# Patient Record
Sex: MALE | Race: WHITE | NOT HISPANIC OR LATINO | Employment: PART TIME | URBAN - METROPOLITAN AREA
[De-identification: names, ages, dates, MRNs, and addresses within clinical notes are randomized per-mention and may not be internally consistent; named-entity substitution may affect disease eponyms.]

---

## 2018-04-17 ENCOUNTER — HOSPITAL ENCOUNTER (OUTPATIENT)
Facility: HOSPITAL | Age: 23
Setting detail: OBSERVATION
Discharge: HOME | End: 2018-04-18
Attending: EMERGENCY MEDICINE | Admitting: FAMILY MEDICINE
Payer: COMMERCIAL

## 2018-04-17 DIAGNOSIS — L03.012 CELLULITIS OF FINGER OF LEFT HAND: ICD-10-CM

## 2018-04-17 DIAGNOSIS — J93.9 PNEUMOTHORAX ON LEFT: ICD-10-CM

## 2018-04-17 DIAGNOSIS — R07.9 CHEST PAIN: Primary | ICD-10-CM

## 2018-04-17 PROBLEM — Z72.0 TOBACCO ABUSE: Chronic | Status: ACTIVE | Noted: 2018-04-17

## 2018-04-17 PROCEDURE — 99285 EMERGENCY DEPT VISIT HI MDM: CPT

## 2018-04-17 PROCEDURE — 93005 ELECTROCARDIOGRAM TRACING: CPT

## 2018-04-17 RX ORDER — OXYCODONE HYDROCHLORIDE 5 MG/1
5 TABLET ORAL ONCE
Status: COMPLETED | OUTPATIENT
Start: 2018-04-17 | End: 2018-04-17

## 2018-04-17 RX ORDER — FENTANYL CITRATE 50 UG/ML
50 INJECTION, SOLUTION INTRAMUSCULAR; INTRAVENOUS ONCE
Status: DISCONTINUED | OUTPATIENT
Start: 2018-04-17 | End: 2018-04-17

## 2018-04-17 RX ADMIN — OXYCODONE HYDROCHLORIDE 5 MG: 5 TABLET ORAL at 22:38

## 2018-04-18 ENCOUNTER — APPOINTMENT (OUTPATIENT)
Dept: RADIOLOGY | Facility: HOSPITAL | Age: 23
End: 2018-04-18
Payer: COMMERCIAL

## 2018-04-18 VITALS
HEIGHT: 69 IN | TEMPERATURE: 97.3 F | BODY MASS INDEX: 16.23 KG/M2 | SYSTOLIC BLOOD PRESSURE: 121 MMHG | HEART RATE: 51 BPM | DIASTOLIC BLOOD PRESSURE: 72 MMHG | OXYGEN SATURATION: 100 % | WEIGHT: 109.57 LBS | RESPIRATION RATE: 18 BRPM

## 2018-04-18 LAB
ATRIAL RATE: 88 BPM
QRS AXIS: 83 DEGREES
QRSD INTERVAL: 96 MS
QT INTERVAL: 362 MS
QTC INTERVAL: 404 MS
T WAVE AXIS: 62 DEGREES
VENTRICULAR RATE: 75 BPM

## 2018-04-18 PROCEDURE — 71045 X-RAY EXAM CHEST 1 VIEW: CPT

## 2018-04-18 PROCEDURE — 99244 OFF/OP CNSLTJ NEW/EST MOD 40: CPT | Performed by: INTERNAL MEDICINE

## 2018-04-18 PROCEDURE — 71046 X-RAY EXAM CHEST 2 VIEWS: CPT

## 2018-04-18 PROCEDURE — 99236 HOSP IP/OBS SAME DATE HI 85: CPT | Performed by: PHYSICIAN ASSISTANT

## 2018-04-18 PROCEDURE — 93010 ELECTROCARDIOGRAM REPORT: CPT | Performed by: INTERNAL MEDICINE

## 2018-04-18 RX ORDER — IBUPROFEN 200 MG
400 TABLET ORAL EVERY 8 HOURS PRN
Status: SHIPPED | OUTPATIENT
Start: 2018-04-18

## 2018-04-18 RX ORDER — ACETAMINOPHEN 325 MG/1
650 TABLET ORAL EVERY 6 HOURS PRN
Status: DISCONTINUED | OUTPATIENT
Start: 2018-04-18 | End: 2018-04-18 | Stop reason: HOSPADM

## 2018-04-18 RX ORDER — CEPHALEXIN 500 MG/1
500 CAPSULE ORAL EVERY 6 HOURS SCHEDULED
Qty: 24 CAPSULE | Refills: 0 | Status: SHIPPED | OUTPATIENT
Start: 2018-04-18 | End: 2018-04-24

## 2018-04-18 RX ORDER — CEPHALEXIN 500 MG/1
500 CAPSULE ORAL EVERY 6 HOURS SCHEDULED
Status: DISCONTINUED | OUTPATIENT
Start: 2018-04-18 | End: 2018-04-18 | Stop reason: HOSPADM

## 2018-04-18 RX ORDER — IBUPROFEN 400 MG/1
800 TABLET ORAL EVERY 8 HOURS PRN
Status: DISCONTINUED | OUTPATIENT
Start: 2018-04-18 | End: 2018-04-18 | Stop reason: HOSPADM

## 2018-04-18 RX ADMIN — IBUPROFEN 800 MG: 400 TABLET, FILM COATED ORAL at 00:48

## 2018-04-18 RX ADMIN — CEPHALEXIN 500 MG: 500 CAPSULE ORAL at 12:07

## 2018-04-18 RX ADMIN — CEPHALEXIN 500 MG: 500 CAPSULE ORAL at 00:46

## 2018-04-18 RX ADMIN — CEPHALEXIN 500 MG: 500 CAPSULE ORAL at 06:03

## 2018-04-18 NOTE — CASE MANAGEMENT
[address] [facility] in the [employer] by Jerardo Irving for 2017  Network Utilization Review Department  Phone: [phone]; Fax [phone]  ATTENTION: The Network Utilization Review Department is now centralized for our 7 Facilities  Please call with any questions or concerns to [phone] and carefully follow the prompts so that you are directed to the right person  All voicemails are confidential  Fax any determinations, approvals, denials, and requests for initial or continue stay review clinical to [phone]  Due to HIGH CALL volume, it would be easier if you could please send faxed requests to expedite your requests and in part, help us provide discharge notifications faster   /////////////////////////////////////////////////////////////////////////////////////////////////////////////////    Initial Clinical Review    Admission: Date/Time/Statement: admit OBS on  4/17  @  8179    Orders Placed This Encounter   Procedures    Place in Observation (expected length of stay for this patient is less than two midnights)     Standing Status:   Standing     Number of Occurrences:   1     Order Specific Question:   Admitting Physician     Answer:   Delmer Alberts [[id number]]     Order Specific Question:   Level of Care     Answer:   Med Surg [16]         ED: Date/Time/Mode of Arrival:   ED Arrival Information     Expected Arrival Acuity Means of Arrival Escorted By Service Admission Type    4/17/2018 21:49 4/17/2018 21:56 Urgent Carried Self General Medicine Urgent    Arrival Complaint    chest pain, pneumothorax on cxr          Chief Complaint:   Chief Complaint   Patient presents with    Chest Pain     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   They said I have a partially collapsed lung"       History of Illness:     25 y o  male who was at work yesterday when he experienced sudden onset stabbing, debilitating left-sided anterior chest pain that went to his back and shoulder area  He did not seek treatment at that time  pain has waxed and waned in the last 24 hours  He has decreased breath sounds in the left upper field  He has no wheezes  ED Vital Signs:   ED Triage Vitals   Temperature Pulse Respirations Blood Pressure SpO2   04/17/18 2203 04/17/18 2203 04/17/18 2203 04/17/18 2203 04/17/18 2203   98 6 °F (37 °C) 84 16 146/69 100 %      Temp Source Heart Rate Source Patient Position - Orthostatic VS BP Location FiO2 (%)   04/18/18 0017 04/17/18 2343 04/18/18 0017 04/17/18 2343 --   Tympanic Monitor Sitting Right arm       Pain Score       04/17/18 2203       5        Wt Readings from Last 1 Encounters:   04/18/18 49 7 kg (109 lb 9 1 oz)     On room air w/100% sats     Abnormal Labs/Diagnostic Test Results:   CXR (from urgent care) was reviewed by myself and our radiologist - which shows a 10-15% PTX on the L    ED Treatment:   Medication Administration from 04/17/2018 2149 to 04/17/2018 7322       Date/Time Order Dose Route Action Action by Comments     04/17/2018 2237 oxyCODONE (ROXICODONE) IR tablet 5 mg 5 mg Oral Given Liliana Spear RN           Past Medical/Surgical History: Active Ambulatory Problems     Diagnosis Date Noted    No Active Ambulatory Problems     Resolved Ambulatory Problems     Diagnosis Date Noted    No Resolved Ambulatory Problems     Past Medical History:   Diagnosis Date    Cellulitis of finger of left hand 4/17/2018    Pneumothorax on left 4/17/2018    Tobacco abuse 4/17/2018       Admitting Diagnosis: Chest pain [R07 9]  Pneumothorax on left [J93 9]  Chest pain, unspecified [R07 9]    Assessment:  Per ATTENDING:  Assessment and plan:  · Spontaneous pneumothorax-likely due to spontaneous rupture of bleb  Start oxygen via face mask accelerated resorption  Repeat chest x-ray at 2:00 p m  Agree with pulmonary evaluation  · Tobacco abuse-strongly emphasized need for tobacco cessation  Left index finger cellulitis-continue Keflex for 7 days total    Resident A/P:   Pneumothorax on left   Plan:  · Vital signs stable on room air  · Repeat chest x-ray tomorrow  · Consult pulmonology  · Encouraged smoking cessation and complete nicotine abstinence     Left index finger cellulitis  · Continue p o  Keflex  · Monitor for clinical improvement and consider MRSA     Tobacco/nicotine abuse  · Encourage total cessation    Admission Orders:  [address] pulmonology  Out of bed as tolerated  Reg diet  Incentive spirometry  Reg diet    Scheduled Meds:   Current Facility-Administered Medications:  acetaminophen 650 mg Oral Q6H PRN Ora King PA-C   cephalexin 500 mg Oral Q6H [address] Ora MATTHEW King   ibuprofen 800 mg Oral Q8H PRN Ora King PA-C       4/18/2018  98 3   67   18   139/79    98% RA       4/18  PULMONOLOGY  1  Spontaneous pneumothorax  1  Oxygen therapy with facemask to aid in reabsoprtion  2  Repeat CXR today at 1400  3  Will need outpatient CXR in 1 week  2  Tobacco abuse  1   Cessation encouraged

## 2018-04-18 NOTE — ED PROVIDER NOTES
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  They said I have a partially collapsed lung"     50-year-old male presents to the emergency department for evaluation of pneumothorax  Patient states that yesterday he was driving to work and then had sudden onset of left-sided chest pain  Patient states that chest pain is been constant since yesterday and radiates to his back  Patient states he has not taken any medication for pain  Patient otherwise states he has never had this happen to him before  Patient denies any fever chest shortness of breath nausea vomiting abdominal pain dysuria constipation or diarrhea              None       Past Medical History:   Diagnosis Date    Cellulitis of finger of left hand 4/17/2018    Pneumothorax on left 4/17/2018    Tobacco abuse 4/17/2018       History reviewed  No pertinent surgical history  History reviewed  No pertinent family history  I have reviewed and agree with the history as documented  Social History   Substance Use Topics    Smoking status: Current Some Day Smoker    Smokeless tobacco: Never Used    Alcohol use Yes      Comment: social        Review of Systems   Constitutional: Negative for appetite change, chills, diaphoresis, fatigue and fever  HENT: Negative for congestion, ear discharge, ear pain, hearing loss, postnasal drip, rhinorrhea, sneezing and sore throat  Eyes: Negative for pain, discharge and redness  Respiratory: Negative for cough, choking, chest tightness, shortness of breath, wheezing and stridor  Cardiovascular: Positive for chest pain  Negative for palpitations  Gastrointestinal: Negative for abdominal distention, abdominal pain, blood in stool, constipation, diarrhea, nausea and vomiting  Genitourinary: Negative for decreased urine volume, difficulty urinating, dysuria, flank pain, frequency and hematuria  Musculoskeletal: Negative for arthralgias, gait problem, joint swelling and neck pain  Skin: Negative for color change, pallor and rash  Allergic/Immunologic: Negative for environmental allergies, food allergies and immunocompromised state  Neurological: Negative for dizziness, seizures, weakness, light-headedness, numbness and headaches  Hematological: Negative for adenopathy  Does not bruise/bleed easily  Psychiatric/Behavioral: Negative for agitation and behavioral problems  Physical Exam  ED Triage Vitals   Temperature Pulse Respirations Blood Pressure SpO2   04/17/18 2203 04/17/18 2203 04/17/18 2203 04/17/18 2203 04/17/18 2203   98 6 °F (37 °C) 84 16 146/69 100 %      Temp Source Heart Rate Source Patient Position - Orthostatic VS BP Location FiO2 (%)   04/18/18 0017 04/17/18 2343 04/18/18 0017 04/17/18 2343 --   Tympanic Monitor Sitting Right arm       Pain Score       04/17/18 2203       5           Orthostatic Vital Signs  Vitals:    04/17/18 2203 04/17/18 2343 04/18/18 0017 04/18/18 0800   BP: 146/69 113/79 139/79 121/72   Pulse: 84 69 67 (!) 51   Patient Position - Orthostatic VS:   Sitting Lying       Physical Exam   Constitutional: He is oriented to person, place, and time  He appears well-developed and well-nourished  HENT:   Head: Normocephalic and atraumatic  Nose: Nose normal    Mouth/Throat: Oropharynx is clear and moist    Eyes: Conjunctivae and EOM are normal  Pupils are equal, round, and reactive to light  Neck: Normal range of motion  Neck supple  Cardiovascular: Normal rate, regular rhythm and normal heart sounds  Exam reveals no gallop and no friction rub  No murmur heard  Pulmonary/Chest: Effort normal and breath sounds normal  No respiratory distress  He has no wheezes  He has no rales  Abdominal: Soft  Bowel sounds are normal  He exhibits no distension  There is no tenderness  There is no rebound and no guarding  Musculoskeletal: Normal range of motion   He exhibits tenderness  Left sided anterior chest pain   Neurological: He is alert and oriented to person, place, and time  Skin: Skin is warm and dry  Psychiatric: He has a normal mood and affect  His behavior is normal    Nursing note and vitals reviewed  ED Medications  Medications   oxyCODONE (ROXICODONE) IR tablet 5 mg (5 mg Oral Given 4/17/18 2238)       Diagnostic Studies  Results Reviewed     None                 XR chest pa only   Final Result by Jarret Soto MD (04/18 0530)      15% left apical pneumothorax  Agree with ED preliminary impression  Referring physician clinical notes states he is aware of the pneumothorax  Workstation performed: [id number]         XR chest pa & lateral    (Results Pending)   XR chest pa & lateral    (Results Pending)         Procedures  Procedures      Phone Consults  ED Phone Contact    ED Course  ED Course                                MDM  Number of Diagnoses or Management Options  Chest pain:   Pneumothorax on left:   Diagnosis management comments: 58-year-old male presents emergency department for evaluation pneumothorax  I will review patient's chest x-ray  I will treat patient's pain of oxycodone  CritCare Time    Disposition  Final diagnoses:   Chest pain   Pneumothorax on left     Time reflects when diagnosis was documented in both MDM as applicable and the Disposition within this note     Time User Action Codes Description Comment    4/17/2018 11:13 PM Lynne Ortega Add [R07 9] Chest pain     4/17/2018 11:14 PM Lynne Ortega Add [J93 9] Pneumothorax on left     4/18/2018  4:07 PM Emelia Leiva Add [L03 012] Cellulitis of finger of left hand       ED Disposition     ED Disposition Condition Comment    Admit  Case was discussed with STEVEN and the patient's admission status was agreed to be Admission Status: observation status to the service of Dr Rosa Ochoa           Follow-up Information     Follow up With Specialties Details Why Contact Info Chelly Vines MD Pulmonary Disease, Pulmonology Follow up please call the office at the number above after you have completed your chest x-ray, have them dexter Simmons Soraya can complete your xray at the radiology department at [facility], no appointment is necessary-please complete before 2pm [address]  [phone]          Discharge Medication List as of 4/18/2018  4:10 PM      START taking these medications    Details   cephalexin (KEFLEX) 500 mg capsule Take 1 capsule (500 mg total) by mouth every 6 (six) hours for 6 days, Starting Wed 4/18/2018, Until Tue 4/24/2018, Print      ibuprofen (MOTRIN) 200 mg tablet Take 2 tablets (400 mg total) by mouth every 8 (eight) hours as needed for mild pain or moderate pain, Starting Wed 4/18/2018, Print             Outpatient Discharge Orders  XR chest pa & lateral   Standing Status: Future  Standing Exp  Date: 04/18/22     Discharge Diet     Call provider for:  severe uncontrolled pain     Call provider for:  difficulty breathing, headache or visual disturbances     Call provider for:  persistent dizziness or light-headedness     Call provider for:  extreme fatigue         ED Provider  Attending physically available and evaluated Nicole Granados  HECTOR managed the patient along with the ED Attending      Electronically Signed by         Kate Leiva MD  04/18/18 2146

## 2018-04-18 NOTE — CONSULTS
Consultation - Pulmonary Medicine   Silver Case 25 y o  male MRN: [id number]  Unit/Bed#: E4 -01 Encounter: [id number]      Assessment/Plan:    1  Spontaneous pneumothorax  1  Oxygen therapy with facemask to aid in reabsoprtion  2  Repeat CXR today at 1400  3  Will need outpatient CXR in 1 week  2  Tobacco abuse  1  Cessation encouraged    History of Present Illness   Physician Requesting Consult: Caridad Gardner MD  Reason for Consult / Principal Problem: Pneumothorax  Hx and PE limited by: none  Chief Complaint: sudden left sided chest pain  HPI: Silver Case is a 25 y o   male who presented to [place] [address]  with complaints of sudden left sided chest pain  He denies additional past medical history  His main presenting symptoms was pain and swelling to his left hand  He reports to working on full shift at [employer] after sudden chest pain, went home and was able to sleep, worked an additional shift and went to an Urgent care  During Urgent care visit he also reported left-sided chest pain that began suddenly the day prior while he was driving  CXR at Urgent care identified an apical pneumothorax and he was sent to the hospital   He denies any recent sick contacts, or exposures  He denies any additional accompanying symptoms  He was admitted and placed on 100% NRB to aid in resolution of pneumothorax      He is a current smoker, he reports to smoking either cigarettes or using a vaporizer daily  He does not have a diagnosis of lung disease, have SOB or HUSSEIN or have a nebulizer or inhaler regimen  He denies a history of asthma, or intubations  He denies reflux or coughing with liquids or solids  He currently reports mild chest pain at rest and with inspiration    He denies: cough/sputum production, fevers, chills, night sweats, nausea, vomiting, diarrhea, headache, dizziness, or hemoptysis      Consults    Review of Systems   Constitutional: Negative for activity change, appetite change, chills, fatigue, fever and unexpected weight change  HENT: Negative for postnasal drip, rhinorrhea, sinus pain, sinus pressure and trouble swallowing  Eyes: Negative for discharge  Cardiovascular: Positive for chest pain  Negative for palpitations and leg swelling  Gastrointestinal: Negative for constipation, diarrhea, nausea and vomiting  Endocrine: Negative for cold intolerance  Genitourinary: Negative for difficulty urinating  Musculoskeletal: Negative for arthralgias and myalgias  Skin: Negative for color change, pallor, rash and wound  Neurological: Negative for dizziness, light-headedness and headaches  Psychiatric/Behavioral: Negative for agitation and hallucinations  All other systems reviewed and are negative  Historical Information   Past Medical History:   Diagnosis Date    Cellulitis of finger of left hand 4/17/2018    Pneumothorax on left 4/17/2018    Tobacco abuse 4/17/2018     History reviewed  No pertinent surgical history  Social History   History   Alcohol Use    Yes     Comment: social     History   Drug Use    Types: Marijuana     History   Smoking Status    Current Some Day Smoker   Smokeless Tobacco    Never Used     Occupational History: [employer] employee    Family History: non-contributory    Meds/Allergies   all current active meds have been reviewed, pertinent pulmonary meds have been reviewed, current meds:   Current Facility-Administered Medications   Medication Dose Route Frequency    acetaminophen (TYLENOL) tablet 650 mg  650 mg Oral Q6H PRN    cephalexin (KEFLEX) capsule 500 mg  500 mg Oral Q6H [address]    ibuprofen (MOTRIN) tablet 800 mg  800 mg Oral Q8H PRN    and PTA meds:   None       Allergies   Allergen Reactions    Margaret Meal     Banana     Nickel        Objective   Vitals: Blood pressure 121/72, pulse (!) 51, temperature (!) 97 3 °F (36 3 °C), temperature source Tympanic, resp   rate 18, height 5' 9" (1 753 m), weight 49 7 kg (109 lb 9 1 oz), SpO2 100 %  100%NRB,Body mass index is 16 18 kg/m²  Intake/Output Summary (Last 24 hours) at 04/18/18 1151  Last data filed at 04/18/18 0900   Gross per 24 hour   Intake              960 ml   Output                0 ml   Net              960 ml     Invasive Devices          No matching active lines, drains, or airways          Physical Exam   Constitutional: He is oriented to person, place, and time  He appears well-nourished  No distress  HENT:   Head: Normocephalic and atraumatic  Eyes: Conjunctivae and EOM are normal    Neck: Normal range of motion  Neck supple  No JVD present  Cardiovascular: Normal rate, regular rhythm and normal heart sounds  Exam reveals no gallop and no friction rub  No murmur heard  Pulmonary/Chest: Effort normal  No respiratory distress  He has decreased breath sounds in the left upper field  He has no wheezes  He has no rhonchi  He has no rales  He exhibits no tenderness  Abdominal: Soft  Bowel sounds are normal    Musculoskeletal: Normal range of motion  He exhibits no edema  Lymphadenopathy:     He has no cervical adenopathy  Neurological: He is alert and oriented to person, place, and time  Skin: Skin is warm and dry  No rash noted  No erythema  No pallor  Psychiatric: He has a normal mood and affect  His behavior is normal  Judgment and thought content normal    Vitals reviewed  Lab Results: I have personally reviewed pertinent lab results  ,  none      Imaging Studies: I have personally reviewed pertinent reports  and I have personally reviewed pertinent films in PACS  CXR 4/18/2018: left apical pneumothorax      Pulmonary Results (PFTs, PSG): none    VTE Prophylaxis: Sequential compression device (Venodyne)     Code Status: Level 1 - Full Code      Portions of the record may have been created with voice recognition software    Occasional wrong word or "sound a like" substitutions may have occurred due to the inherent limitations of voice recognition software  Read the chart carefully and recognize, using context, where substitutions have occurred

## 2018-04-18 NOTE — DISCHARGE SUMMARY
Discharge Summary - [facility] Internal Medicine    Patient Information: Barrett Tamayo 25 y o  male MRN: [id number]  Unit/Bed#: E4 -01 Encounter: [id number]    Discharging Physician / Practitioner: Lucy Pennington MD  PCP: No primary care provider on file  Admission Date: 4/17/2018  Discharge Date: 04/18/18    Reason for Admission: chest pain     Discharge Diagnoses:     Principal Problem:    Pneumothorax on left  Active Problems:    Chest pain    Tobacco abuse    Cellulitis of finger of left hand    Consultations During Hospital Stay:  · Pulmonary    Procedures Performed:     · none    Significant Findings:     · none    Incidental Findings:   · none     Test Results Pending at Discharge (will require follow up):   · none     Outpatient Tests Requested:  · none    Complications:  none    Hospital Course:     Barrett Tamayo is a 25 y o  male patient who originally presented to the hospital on 4/17/2018 due to sudden left upper chest pain  He 1st went to an outpatient urgent care center and he was found to have a pneumothorax and was sent to the hospital   A chest x-ray was done here which confirmed a 15% left upper pneumothorax  This was treated with oxygen via face mask  He did not require a chest tube  Follow-up chest x-ray later in the day still showed persistent pneumothorax but without worsening  The patient was hemodynamically stable and had minimal pain and was eager to go home  The patient will be discharged home in stable condition  He will have a chest x-ray tomorrow and this will be followed up by the pulmonary service  The patient was advised to return to the hospital should his chest pain worsen or should he developed worsening shortness of breath  He was strongly advised to stop smoking    He was placed on keflex for left index finger cellulitis  A 6 day prescription was provided        Condition at Discharge: good     Discharge Day Visit / Exam:     * Please refer to separate progress for these details *    Discharge instructions/Information to patient and family:   See after visit summary for information provided to patient and family  Provisions for Follow-Up Care:  See after visit summary for information related to follow-up care and any pertinent home health orders  Disposition:     Home      Discharge Statement:  I spent 30 minutes discharging the patient  This time was spent on the day of discharge  I had direct contact with the patient on the day of discharge  Greater than 50% of the total time was spent examining patient, answering all patient questions, arranging and discussing plan of care with patient as well as directly providing post-discharge instructions  Additional time then spent on discharge activities  Spoke with significant other and father earlier today  Discharge Medications:  See after visit summary for reconciled discharge medications provided to patient and family  ** Please Note: Dragon 360 Dictation voice to text software may have been used in the creation of this document   **

## 2018-04-18 NOTE — ED ATTENDING ATTESTATION
Consuelo Oliva MD, saw and evaluated the patient  I have discussed the patient with the resident/non-physician practitioner and agree with the resident's/non-physician practitioner's findings, Plan of Care, and MDM as documented in the resident's/non-physician practitioner's note, except where noted  All available labs and Radiology studies were reviewed  At this point I agree with the current assessment done in the Emergency Department  I have conducted an independent evaluation of this patient a history and physical is as follows:      Critical Care Time  CritCare Time    Procedures     C/o L sided chest pain - sudden onset yesterday while driving around 6pm   No trauma  Pt  Went to [facility] and had a CXR that showed a pneuomothorax  No sob  Pt  Never had a pneuomothorax before  Well-appearing,no distress, RRR, CTA b/l, abd  -nontender, ext  - no edema, pulse ox is 100 % RA    Pt  Admitted for observation    CXR (from urgent care) was reviewed by myself and our radiologist - which shows a 10-15% PTX on the L   Pt  Is stable for observation admission

## 2018-04-18 NOTE — H&P
History and Physical - [facility]    Patient Information: Estefany Macdonald 25 y o  male MRN: [id number]  Unit/Bed#: E4 -01 Encounter: [id number]  Admitting Physician: Sun Buitrago PA-C  PCP: No primary care provider on file  Date of Admission:  04/18/18      Assessment:    Pneumothorax on left    Plan:    Pneumothorax on left  · Vital signs stable on room air  · Repeat chest x-ray tomorrow  · Consult pulmonology  · Encouraged smoking cessation and complete nicotine abstinence    Left index finger cellulitis  · Continue p o  Keflex  · Monitor for clinical improvement and consider MRSA    Tobacco/nicotine abuse  · Encourage total cessation      HPI:   Krzysztof Damico is a 25 y o  male who was at work yesterday when he experienced sudden onset stabbing, debilitating left-sided anterior chest pain that went to his back and shoulder area  He did not seek treatment at that time  The pain would wax and wane in the last 24 hours  He went to urgent care today because of redness and pain with finger flexion of the left hand index finger  He noticed it when he was washing dishes yesterday  Denies: Chest pain, Shortness of Breath, Nausea, Vomiting, Diarrhea, Dysuria    ROS:  A 12-point review of systems was done  Please see the HPI for the full details  All other systems negative  PMH:  Principal Problem:    Pneumothorax on left  Active Problems:    Chest pain    Tobacco abuse    Cellulitis of finger of left hand      Past Medical History:   Diagnosis Date    Cellulitis of finger of left hand 4/17/2018    Pneumothorax on left 4/17/2018    Tobacco abuse 4/17/2018     History reviewed  No pertinent surgical history    Social History     Social History    Marital status: Single     Spouse name: N/A    Number of children: N/A    Years of education: N/A     Social History Main Topics    Smoking status: Current Some Day Smoker    Smokeless tobacco: Never Used    Alcohol use Yes Comment: social    Drug use: Yes     Types: Marijuana    Sexual activity: Not Asked     Other Topics Concern    None     Social History Narrative    None     History reviewed  No pertinent family history  MED/ALLERGIES:  No current facility-administered medications for this encounter  Allergies   Allergen Reactions    [place] Meal     Banana     Nickel        OBJECTIVE:    Current Vitals:   Blood Pressure: 113/79 (04/17/18 2343)  Pulse: 69 (04/17/18 2343)  Temperature: 98 6 °F (37 °C) (04/17/18 2203)  Respirations: 16 (04/17/18 2343)  Weight - Scale: 52 2 kg (115 lb) (04/17/18 2203)  SpO2: 100 % (04/17/18 2343)    No intake or output data in the 24 hours ending 04/18/18 0010    Invasive Devices          No matching active lines, drains, or airways            Physical Exam   Constitutional: He is oriented to person, place, and time  He appears well-developed and well-nourished  Thin male   HENT:   Head: Normocephalic and atraumatic  Right Ear: External ear normal    Left Ear: External ear normal    Eyes: EOM are normal  Pupils are equal, round, and reactive to light  Neck: Neck supple  No thyromegaly present  Cardiovascular: Normal rate, regular rhythm and normal heart sounds  Exam reveals no gallop  No murmur heard  Pulmonary/Chest: Effort normal  No accessory muscle usage  No respiratory distress  He has decreased breath sounds in the left upper field  He has no wheezes  He has no rhonchi  He has no rales  Abdominal: Soft  Normal appearance and bowel sounds are normal  There is no tenderness  There is no CVA tenderness  Musculoskeletal: Normal range of motion  He exhibits no edema or tenderness  Lymphadenopathy:     He has no cervical adenopathy  Neurological: He is alert and oriented to person, place, and time  No cranial nerve deficit  Skin: Skin is warm and dry  No rash noted  No erythema  No pallor  Psychiatric: He has a normal mood and affect   His behavior is normal  Judgment and thought content normal                                                      Lab Results:          Invalid input(s): LABALBU  No results found for: CKTOTAL, CKMB, CKMBINDEX, TROPONINI      Imaging: CXR: Left apical pneumothorax    EKG: NSR, No ST segment elevation or depression; No T-wave changes    VTE Prophylaxis: Reason for no pharmacologic prophylaxis LOW RISK    Code Status: FULL    Counseling / Coordination of Care: Total floor / unit time spent today 45 minutes  Anticipated Length of Stay will be: MORE THAN 2 (TWO) Midnights:     Leydi Álvarez PA-C    This note has been constructed using a voice recognition system

## 2018-04-19 ENCOUNTER — HOSPITAL ENCOUNTER (OUTPATIENT)
Dept: RADIOLOGY | Facility: HOSPITAL | Age: 23
Discharge: HOME | End: 2018-04-19
Attending: INTERNAL MEDICINE
Payer: COMMERCIAL

## 2018-04-19 ENCOUNTER — TELEPHONE (OUTPATIENT)
Dept: OTHER | Facility: HOSPITAL | Age: 23
End: 2018-04-19

## 2018-04-19 DIAGNOSIS — J93.11 PRIMARY SPONTANEOUS PNEUMOTHORAX: Primary | ICD-10-CM

## 2018-04-19 DIAGNOSIS — J93.9 PNEUMOTHORAX ON LEFT: ICD-10-CM

## 2018-04-19 PROCEDURE — 71046 X-RAY EXAM CHEST 2 VIEWS: CPT

## 2018-04-19 NOTE — TELEPHONE ENCOUNTER
Mushtaq Santacruz completed his CXR today in the radiology department at [facility]  CXR demonstrated mild improvement in left apical pneumothorax  Mushtaq Santacruz was called and results were provided  Plan will be to have a repeat CXR Monday, at which time I will review the imaging and then contact mary with the results  He was instructed to come to the emergency department if he developed: shortness of breath or chest pain    Mary understood instructions/plan and denied further questions

## 2018-04-23 ENCOUNTER — TELEPHONE (OUTPATIENT)
Dept: OTHER | Facility: HOSPITAL | Age: 23
End: 2018-04-23

## 2018-04-23 ENCOUNTER — HOSPITAL ENCOUNTER (OUTPATIENT)
Dept: RADIOLOGY | Facility: HOSPITAL | Age: 23
Discharge: HOME | End: 2018-04-23
Payer: COMMERCIAL

## 2018-04-23 DIAGNOSIS — J93.11 PRIMARY SPONTANEOUS PNEUMOTHORAX: ICD-10-CM

## 2018-04-23 PROCEDURE — 71046 X-RAY EXAM CHEST 2 VIEWS: CPT

## 2018-04-23 NOTE — TELEPHONE ENCOUNTER
I called Martha De Luna today and provided the results of his CXR  His chest x-ray demonstrated significant improvement in the left apical pneumothorax  Ohiojackie De Luna reports chest pain is very minimal and feels it hs significantly improved  He denies Shortness of breath at rest or dyspnea on exertion  He was instructed that further follow up was unnecessary unless he develops increasing SOB or chest pain, at which time he should return to the hospital for evaluation  He was again counseled on the importance of smoking cessation with both cigarettes and vaporizer  He expressed understanding of instructions and denied any further questions

## 2020-06-22 ENCOUNTER — OFFICE VISIT (OUTPATIENT)
Dept: URGENT CARE | Age: 25
End: 2020-06-22
Payer: COMMERCIAL

## 2020-06-22 VITALS — HEART RATE: 67 BPM | RESPIRATION RATE: 16 BRPM | OXYGEN SATURATION: 98 % | TEMPERATURE: 103.6 F

## 2020-06-22 DIAGNOSIS — R50.9 FEVER, UNSPECIFIED FEVER CAUSE: Primary | ICD-10-CM

## 2020-06-22 LAB — S PYO AG THROAT QL: NEGATIVE

## 2020-06-22 PROCEDURE — U0003 INFECTIOUS AGENT DETECTION BY NUCLEIC ACID (DNA OR RNA); SEVERE ACUTE RESPIRATORY SYNDROME CORONAVIRUS 2 (SARS-COV-2) (CORONAVIRUS DISEASE [COVID-19]), AMPLIFIED PROBE TECHNIQUE, MAKING USE OF HIGH THROUGHPUT TECHNOLOGIES AS DESCRIBED BY CMS-2020-01-R: HCPCS | Performed by: PHYSICIAN ASSISTANT

## 2020-06-22 PROCEDURE — 87880 STREP A ASSAY W/OPTIC: CPT | Performed by: PHYSICIAN ASSISTANT

## 2020-06-22 PROCEDURE — 99213 OFFICE O/P EST LOW 20 MIN: CPT | Performed by: PHYSICIAN ASSISTANT

## 2020-06-23 ENCOUNTER — TELEPHONE (OUTPATIENT)
Dept: URGENT CARE | Age: 25
End: 2020-06-23

## 2020-06-23 LAB — SARS-COV-2 RNA SPEC QL NAA+PROBE: NOT DETECTED

## 2022-02-02 ENCOUNTER — TELEPHONE (OUTPATIENT)
Dept: NEUROLOGY | Facility: CLINIC | Age: 27
End: 2022-02-02

## 2022-02-02 NOTE — TELEPHONE ENCOUNTER
Called and left a voicemail for patient - Please call back to confirm upcoming appointment with Dr Dominique Recio  Provided patient with apt date, time and location  Informed patient that check in is at least 15 minutes prior to apt time  SUGGESTED PT CHANGE TO VIRTUAL

## 2022-02-09 NOTE — TELEPHONE ENCOUNTER
Called and spoke to patient - confirmed upcoming appointment with Dr Annetta Haas  Provided patient with apt date, time and location  Informed patient that check in is at least 15 minutes prior to apt time

## 2022-02-15 NOTE — PROGRESS NOTES
[facility] Consult  PATIENT:  Lakisha Deleon  MRN:  [id number]  :  1995  DATE OF SERVICE:  2022  REFERRED BY: Todd Aguilar MD  PMD: No primary care provider on file  Assessment/Plan:     Mckinley Mccann II is a delightful 23 y o  male with a past medical history that includes fibromyalgia, anxiety, depression, migraine, hypersomnia, dyslipidemia, medical marijuana use, posttraumatic headache, fatigue, 20 head trauma with right side laceration with chronic paresthesias referred here for evaluation of headache  My initial evaluation 2022     Chronic migraine without aura without status migrainosus, not intractable  Numbness and tingling of right side of face  He has a long history of chronic headaches and migraines dating back to childhood  Family history of headaches in both parents  He also has history of chronic pain syndrome and other areas of the body  He reports head pain typically may start within 30 minutes of waking and can build to the day be coming often most significant about 4-6 hours into the day  Often starts with right facial paresthesias and radiates back along the right side of the head with allodynia and hyperesthesia  Also has left-sided head pressure  He denies aura and reports typical associated migrainous features without unilateral autonomic features to suggest trigeminal autonomic cephalalgia  - as of 2022 he reports headaches have been every day for at least the past 1-2 years, worse on average 15 days per month or more  Do of course get worse temporarily after hitting his head  The paresthesias seemed to start after scalp laceration incident 2020, however will evaluate to ensure no intracranial cause  Trial of Emgality for prevention       Workup:  - Neurologic assessment reveals normal neurological exam, except for decreased sensation right face V2 V3 distribution and hyperesthesias V1 distribution where previous trauma was  - due to increased frequency and severity of headaches and migraines I recommend further evaluation with MRI brain with without contrast to rule out structural or treatable causes of symptoms  With focus on trigeminal region on right to ensure no other cause for facial paresthesias    Preventative:  - we discussed headache hygiene and lifestyle factors that may improve headaches, including following up with sleep medicine for previously ordered MLST test, mental health care, decreasing caffeine  - trial of emgality  Discussed proper use, possible side effects and risks  - Currently on through other providers: medical marijuana   - Past/ failed/contraindicated: Amitriptyline, Venlafaxine, Gabapentin, Propranolol     - future options: alternative CGRP med, botox    Abortive:  - discussed not taking over-the-counter or prescription pain medications more than 3 days per week to prevent medication overuse/rebound headache - typically exedrin helps   - prescription abortive not indicated at this time as migraines are daily and would need to decrease number of migraines prior to adding abortive that can only be taken a few times a month  - Past/ failed/contraindicated: OTC meds   - future options:  Could consider triptan, prochlorperazine, Toradol IM or p o , could consider trial for 5 days of Depakote or dexamethasone for prolonged migraine, ubrelvy, reyvow, nurtec      Acute post-traumatic headache, not intractable  H/O concussion - resolved  He has followed with other providers in the past for concussion  Most recently 01/22/2022 he accidentally fell down the steps and hit his right forehead and worsening of his daily chronic headaches and dizziness  Could have been a concussion versus posttraumatic headache with migrainous features  We discussed regardless, at this time concussion would likely be over    He subsequently has followed up with primary care provider and [facility] NP team  We have discussed concussions and the natural course of recovery  We have discussed that symptoms from a concussion typically take 2 weeks to resolve, and although sometimes it can feel like concussion symptoms linger on, at this point these symptoms would be related to contributing factors  - Contributing factors may include:   Post traumatic stress, Prolonged removal from normal routine,  posttraumatic headache,  comorbid injuries, preexisting chronic headaches or migraines,  medication overuse headache, anxiety or depression, stress, deconditioning,  comorbid medical diagnoses, young age  - I have recommended gradual return of normal cognitive and physical activity with safety precautions  - We discussed that newer research regarding concussion shows that the sooner one returns gradually to their normal physical and cognitive routine, the sooner one tends to recover  Prolonged removal from normal routine and deconditioning have been shown to prolong symptoms and worsen symptoms   - We discussed that sometimes there is a constellation of symptoms that some refer to as "post concussion syndrome," but I prefer not to use this term since that can be misleading and make people think they are still brain injured or "concussed," when the most common and likely etiology this far out from the head trauma is either contributing factors or a form of functional neurologic disorder with mixed symptoms  - We discussed how cognitive issues can have multiple causes and often related to multifactorial etiologies including stress, anxiety,  mood, pain, hypervigilance  and sleep issues and provided reassurance that, it is not likely the cognitive dysfunction is related to concussion at this point    - Safe driving precautions, should not drive at all if feeling sleepy or cognitively not well  * We discussed the most likely therapy to help in these cases would be counseling    I do not typically recommend cognitive therapy, vision therapy or other specific concussion therapies once concussion is over  Hypersomnia, sleep study 2016-for [facility], MLST ordered and not completed  -     Ambulatory referral to Sleep Medicine; Future    Patient instructions          Additional Testing:   Neurodiagnostic workup:  MRI Brain ordered    Headache/migraine treatment:   Abortive medications (for immediate treatment of a headache): It is ok to take ibuprofen, acetaminophen or naproxen (Advil, Tylenol,  Aleve, Excedrin) if they help your headaches you should limit these to No more than 3 times a week to avoid medication overuse/rebound headaches  Prescription preventive medications for headaches/migraines   (to take every day to help prevent headaches - not to take at the time of headache):  [x] Emgality/Galcanezumab - the 1st dose is 240 mg loading dose of 2 consecutive 120 mg injections  Thereafter, 120 mg injections every 30 days     READ INSTRUCTIONS that come with the medication  REFRIGERATE  Keep out of direct sunlight  Prior to administration, allow to come to room temperature for 30 minutes  Do not warm using a heat source (eg, microwave or hot water)  Do not shake  Administer in preferably abdomen (avoiding 2 inches around the navel), thigh, upper arm, or buttocks avoiding areas of skin that are tender, bruised, red or hard  Deliver entire contents of single-use prefilled pen or syringe  *Typically these types of medications take time untill you see the benefit, although some may see improvement in days, often it may take weeks, especially if the medication is being titrated up to a beneficial level  Please contact us if there are any concerns or questions regarding the medication  Lifestyle Recommendations:  [x] SLEEP - Maintain a regular sleep schedule: Adults need at least 7-8 hours of uninterrupted a night   Maintain good sleep hygiene:  Going to bed and waking up at consistent times, avoiding excessive daytime naps, avoiding caffeinated beverages in the evening, avoid excessive stimulation in the evening and generally using bed primarily for sleeping  One hour before bedtime would recommend turning lights down lower, decreasing your activity (may read quietly, listen to music at a low volume)  When you get into bed, should eliminate all technology (no texting, emailing, playing with your phone, iPad or tablet in bed)  [x] HYDRATION - Maintain good hydration  Drink  2L of fluid a day (4 typical small water bottles)  [x] DIET - Maintain good nutrition  In particular don't skip meals and try and eat healthy balanced meals regularly  [x] TRIGGERS - Look for other triggers and avoid them: Limit caffeine to 1-2 cups a day or less  Avoid dietary triggers that you have noticed bring on your headaches (this could include aged cheese, peanuts, MSG, aspartame and nitrates)  [x] EXERCISE - physical exercise as we all know is good for you in many ways, and not only is good for your heart, but also is beneficial for your mental health, cognitive health and  chronic pain/headaches  I would encourage at the least 5 days of physical exercise weekly for at least 30 minutes  Education and Follow-up  [x] Please call with any questions or concerns  Of course if any new concerning symptoms go to the emergency department  [x] Follow up 3 months, sooner if needed     - As we discussed if there are no abnormalities on the brain MRI that need urgent intervention (very often there are incidental findings that may not mean anything significant and are better discussed in person), you will not necessarily receive a call from us, but feel free to call in to check on the status of the report (since not knowing can be anxiety provoking) and the nurses will let you know the result or make sure I have nothing to pass on regarding the results first   Ideally, all of this will be discussed in detail in the follow-up visit         CC:   We had the pleasure of evaluating Adam Santiago II in neurological consultation today  Adam Santiago II is a   right handed male who presents today for evaluation of headaches  History obtained from patient as well as available medical record review  History of Present Illness:   Current medical illnesses  or past medical history include fibromyalgia, anxiety, depression, migraine, hypersomnia, dyslipidemia, posttraumatic headache, fatigue, 12/26/20 head trauma with right side laceration with chronic paresthesias     On 01/22/2022 he accidentally fell down the steps at his house and landed at the bottom hitting his head on the hardwood railing  Hit right forehead  Acute symptoms included:  No LOC, no amnesia, headaches, dizzy  Stayed home from work 3 days  Followed up with PCP 01/26/2022    he has followed with [facility] NP team in the past, see EMR   - most recent visit 1/27/22    Headaches started at what age? 89 years old  How often do the headaches occur?   - as of 2/16/2022: 1-2 years of every day, bad every other day   What time of the day do the headaches start? Not there when he wakes for 30 mins and builds throughout the day   How long do the headaches last? All day, 4-6 hour after waking worse  Are you ever headache free? Yes    Aura? without aura     Last eye exam: in 2021, no other issues     Where is your headache located and pain quality?   - right side of head, right forehead, right parietal, right face  - V1-V3 numb tingling and then works back along right side and hair feels heavy  - then left side of head pressure      What is the intensity of pain?  Average: 3-6/10, worst 9-10/10  Associated symptoms:   [x] Nausea       [x] Vomiting - can happen for 2 days in a row and then not for 3 months    [x] Problems with concentration  [x] Photophobia >    [x]Phonophobia      [] Osmophobia  [x] Blurred vision   [x] Light-headed or dizzy     [] Tinnitus    [x] right facial paresthesias and no where else   [] Ptosis      [] Facial droop  [] Lacrimation  [] Nasal congestion/rhinorrhea  - chronic congestion       Things that make the headache worse? No specific movements, any quick movement     Headache triggers:  Stress, caffeine withdrawal, anything can trigger it    Have you seen someone else for headaches or pain? Yes   Have you ever had any Brain imaging? CT scan     What medications do you take or have you taken for your headaches? ABORTIVE:      exedrin every other day, brings it down and able to function   Acetaminophen  Ibuprofen    Past  Tylenol 3 with codeine     PREVENTIVE:       Medical marijuana every day     Past/ failed/contraindicated:  Amitriptyline he thinks  Venlafaxine  Gabapentin   Propranolol     -      LIFESTYLE  Sleep - chronic hypersomnia - sleep study 12/2016  - no significant sleep apnea, AHI 3 5, MLST ordered  - averages: 7-10 hours    Water: a gallon per day  Caffeine: 2 energy drinks per day    Mood: anxiety, depression, never with psychiatry or counselor, "ok"  - mom is a therapist     The following portions of the patient's history were reviewed and updated as appropriate: allergies, current medications, past family history, past medical history, past social history, past surgical history and problem list     Pertinent family history:  Family history of headaches:  headaches of unknown type in mother and father  Any family history of aneurysms - Not first gen - dad's step dad    Pertinent social history:  Work: not currently   - as of 2/16/2022 he reports 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  Education: HS  Lives with two best friends and his fiance, cats       Past Medical History:   - 10/7/13   - 3/31/15   - 12/26/2020, he was on a tire swing in a garage when the swing rope broke and he fell hitting his head on a   tool box  He had a large facial/scalp laceration that was bleeding  He was transported to [facility] for evaluation  Acute symptoms included:  Headache, neck pain, nausea  He was evaluated in the ER were laceration was repaired noncontrast head CT was unremarkable for acute pathology  He reported headache, neck pain and nausea  He subsequently followed up with [facility] NP team who  referred to vestibular, cognitive therapy, neuro optometry   - CT head 1/8/2021 normal       Past Medical History:   Diagnosis Date    Cellulitis of finger of left hand 4/17/2018    Head injury     Pneumothorax on left 4/17/2018    Tobacco abuse 4/17/2018       Patient Active Problem List   Diagnosis    Chest pain    Pneumothorax on left    Tobacco abuse    Cellulitis of finger of left hand       Medications:      Current Outpatient Medications   Medication Sig Dispense Refill    acetaminophen (TYLENOL) 325 mg tablet Take 650 mg by mouth every 6 (six) hours as needed      ibuprofen (MOTRIN) 200 mg tablet Take 2 tablets (400 mg total) by mouth every 8 (eight) hours as needed for mild pain or moderate pain      NON FORMULARY Medical Marijuana      Galcanezumab-gnlm 120 MG/ML SOAJ Inject 240 mg under the skin once for 1 dose For just the first month loading dose, followed by 1 injection monthly on separate prescription  2 mL 0    Galcanezumab-gnlm 120 MG/ML SOAJ Inject 120 mg under the skin every 30 (thirty) days Following the first month loading dose of 2 pens  1 mL 11     No current facility-administered medications for this visit  Allergies:       Allergies   Allergen Reactions    [place] Meal - Food Allergy     Banana - Food Allergy     Nickel Other (See Comments)     "Throat closes"       Family History:     Family History   Problem Relation Age of Onset   Abby Boucher Breast cancer Mother     Heart disease Father     Hypertension Father     Hyperlipidemia Father     Cancer Paternal Grandmother        Social History:       Social History     Socioeconomic History    Marital status: Single     Spouse name: Not on file    Number of children: Not on file    Years of education: Not on file    Highest education level: Not on file   Occupational History    Not on file   Tobacco Use    Smoking status: Former Smoker    Smokeless tobacco: Never Used   Vaping Use    Vaping Use: Every day    Substances: Nicotine, Flavoring   Substance and Sexual Activity    Alcohol use: Yes     Comment: social    Drug use: Yes     Types: Marijuana     Comment: medical marijuana    Sexual activity: Not on file   Other Topics Concern    Not on file   Social History Narrative    Not on file     Social Determinants of Health     Financial Resource Strain: Not on file   Food Insecurity: Not on file   Transportation Needs: Not on file   Physical Activity: Not on file   Stress: Not on file   Social Connections: Not on file   Intimate Partner Violence: Not on file   Housing Stability: Not on file         Objective:       Physical Exam:                                                                 Vitals:            Constitutional:    /64 (BP Location: Left arm, Patient Position: Sitting, Cuff Size: Standard)   Pulse 83   Ht 5' 10" (1 778 m)   Wt 54 kg (119 lb)   BMI 17 07 kg/m²   BP Readings from Last 3 Encounters:   02/16/22 121/64   04/18/18 121/72     Pulse Readings from Last 3 Encounters:   02/16/22 83   06/22/20 67   04/18/18 (!) 51         Well developed, well nourished, well groomed  No dysmorphic features  HEENT:  Normocephalic atraumatic  Oropharynx is clear and moist  No oral mucosal lesions  Chest:  Respirations regular and unlabored      Cardiovascular:  Distal extremities warm without palpable edema or tenderness, no observed significant swelling  Musculoskeletal:  (see below under neurologic exam for evaluation of motor function and gait)   Skin:  warm and dry, not diaphoretic  No apparent birthmarks or stigmata of neurocutaneous disease  Psychiatric:  Normal behavior and appropriate affect        Neurological Examination:     Mental status/cognitive function:   Recent and remote memory intact  Attention span and concentration as well as fund of knowledge are appropriate for age  Normal language and spontaneous speech  Cranial Nerves:  II-visual fields full  Fundi poorly visualized due to pupillary constriction  III, IV, VI-Pupils were equal, round, and reactive to light and accomodation  Extraocular movements were full and conjugate without nystagmus  Convergence 5 cm, conjugate gaze, normal smooth pursuits, normal saccades   V-facial sensation symmetric  decreased at V2,3, hyperesthesia V1   VII-facial expression symmetric, intact forehead wrinkle, strong eye closure, symmetric smile    VIII-hearing grossly intact bilaterally   IX, X-palate elevation symmetric, no dysarthria  XI-shoulder shrug strength intact    XII-tongue protrusion midline  Motor Exam: symmetric bulk and tone throughout, no pronator drift  Power/strength 5/5 bilateral upper and lower extremities, no atrophy, fasciculations or abnormal movements noted  Sensory: grossly intact light touch in all extremities  Reflexes: brachioradialis 2+, biceps 2+, knee 2+, ankle 2+ bilaterally  No ankle clonus  Coordination: Finger nose finger intact bilaterally, no apparent dysmetria, ataxia or tremor noted  Gait: steady casual and tandem gait       Pertinent lab results:   - 04/13/2021 CBC unremarkable except for WBC 10 6  CMP unremarkable except for calcium 10 2  TSH normal 0 45  - 09/21/19 ESR 3, CRP <3    Imaging:   No head imaging noted in our system       Review of Systems:   ROS obtained by medical assistant Personally reviewed and updated if indicated  I recommended PCP follow up for non neurologic problems  Review of Systems   Constitutional: Negative  Negative for appetite change and fever  HENT: Negative  Negative for hearing loss, tinnitus, trouble swallowing and voice change  Eyes: Negative  Negative for photophobia and pain  Respiratory: Negative  Negative for shortness of breath  Cardiovascular: Negative  Negative for palpitations  Gastrointestinal: Negative  Negative for nausea and vomiting  Endocrine: Negative  Negative for cold intolerance  Genitourinary: Negative  Negative for dysuria, frequency and urgency  Musculoskeletal:  Negative for myalgias and neck pain  Skin: Negative  Negative for rash  Neurological: Positive for  headachesNegative for tremors, seizures, syncope, facial asymmetry, speech difficulty, weakness, light-headedness and numbness  Hematological: Negative  Does not bruise/bleed easily  Psychiatric/Behavioral: Negative  Negative for confusion, hallucinations and sleep disturbance  I have spent 58 minutes with Patient today in which greater than 50% of this time was spent in counseling/coordination of care regarding Diagnostic results, Prognosis, Risks and benefits of tx options, Intructions for management, Patient education, Importance of tx compliance, Risk factor reductions and Impressions  I also spent 30 minutes non face to face for this patient the same day           Author:  Eula Su MD 2/16/2022 10:17 AM

## 2022-02-16 ENCOUNTER — CONSULT (OUTPATIENT)
Dept: NEUROLOGY | Facility: CLINIC | Age: 27
End: 2022-02-16
Payer: COMMERCIAL

## 2022-02-16 VITALS
SYSTOLIC BLOOD PRESSURE: 121 MMHG | HEART RATE: 83 BPM | HEIGHT: 70 IN | BODY MASS INDEX: 17.04 KG/M2 | WEIGHT: 119 LBS | DIASTOLIC BLOOD PRESSURE: 64 MMHG

## 2022-02-16 DIAGNOSIS — G44.319 ACUTE POST-TRAUMATIC HEADACHE, NOT INTRACTABLE: Primary | ICD-10-CM

## 2022-02-16 DIAGNOSIS — R20.2 NUMBNESS AND TINGLING OF RIGHT SIDE OF FACE: ICD-10-CM

## 2022-02-16 DIAGNOSIS — G43.709 CHRONIC MIGRAINE WITHOUT AURA WITHOUT STATUS MIGRAINOSUS, NOT INTRACTABLE: ICD-10-CM

## 2022-02-16 DIAGNOSIS — R20.0 NUMBNESS AND TINGLING OF RIGHT SIDE OF FACE: ICD-10-CM

## 2022-02-16 DIAGNOSIS — G47.10 HYPERSOMNIA: ICD-10-CM

## 2022-02-16 DIAGNOSIS — Z87.820 H/O CONCUSSION: ICD-10-CM

## 2022-02-16 PROCEDURE — 99205 OFFICE O/P NEW HI 60 MIN: CPT | Performed by: PSYCHIATRY & NEUROLOGY

## 2022-02-16 RX ORDER — ACETAMINOPHEN 325 MG/1
650 TABLET ORAL EVERY 6 HOURS PRN
COMMUNITY

## 2022-02-16 NOTE — PROGRESS NOTES
Review of Systems   Constitutional: Negative  Negative for appetite change and fever  HENT: Negative  Negative for hearing loss, tinnitus, trouble swallowing and voice change  Eyes: Negative  Negative for photophobia and pain  Respiratory: Negative  Negative for shortness of breath  Cardiovascular: Negative  Negative for palpitations  Gastrointestinal: Negative  Negative for nausea and vomiting  Endocrine: Negative  Negative for cold intolerance  Genitourinary: Negative  Negative for dysuria, frequency and urgency  Musculoskeletal: Positive for gait problem  Negative for myalgias and neck pain  Skin: Negative  Negative for rash  Neurological: Positive for dizziness and headaches (daily)  Negative for tremors, seizures, syncope, facial asymmetry, speech difficulty, weakness, light-headedness and numbness  Hematological: Negative  Does not bruise/bleed easily  Psychiatric/Behavioral: Negative  Negative for confusion, hallucinations and sleep disturbance

## 2022-02-16 NOTE — PATIENT INSTRUCTIONS
Additional Testing:   Neurodiagnostic workup:  MRI Brain ordered    Headache/migraine treatment:   Abortive medications (for immediate treatment of a headache): It is ok to take ibuprofen, acetaminophen or naproxen (Advil, Tylenol,  Aleve, Excedrin) if they help your headaches you should limit these to No more than 3 times a week to avoid medication overuse/rebound headaches  Prescription preventive medications for headaches/migraines   (to take every day to help prevent headaches - not to take at the time of headache):  [x] Emgality/Galcanezumab - the 1st dose is 240 mg loading dose of 2 consecutive 120 mg injections  Thereafter, 120 mg injections every 30 days     READ INSTRUCTIONS that come with the medication  REFRIGERATE  Keep out of direct sunlight  Prior to administration, allow to come to room temperature for 30 minutes  Do not warm using a heat source (eg, microwave or hot water)  Do not shake  Administer in preferably abdomen (avoiding 2 inches around the navel), thigh, upper arm, or buttocks avoiding areas of skin that are tender, bruised, red or hard  Deliver entire contents of single-use prefilled pen or syringe  *Typically these types of medications take time untill you see the benefit, although some may see improvement in days, often it may take weeks, especially if the medication is being titrated up to a beneficial level  Please contact us if there are any concerns or questions regarding the medication  Lifestyle Recommendations:  [x] SLEEP - Maintain a regular sleep schedule: Adults need at least 7-8 hours of uninterrupted a night  Maintain good sleep hygiene:  Going to bed and waking up at consistent times, avoiding excessive daytime naps, avoiding caffeinated beverages in the evening, avoid excessive stimulation in the evening and generally using bed primarily for sleeping    One hour before bedtime would recommend turning lights down lower, decreasing your activity (may read quietly, listen to music at a low volume)  When you get into bed, should eliminate all technology (no texting, emailing, playing with your phone, iPad or tablet in bed)  [x] HYDRATION - Maintain good hydration  Drink  2L of fluid a day (4 typical small water bottles)  [x] DIET - Maintain good nutrition  In particular don't skip meals and try and eat healthy balanced meals regularly  [x] TRIGGERS - Look for other triggers and avoid them: Limit caffeine to 1-2 cups a day or less  Avoid dietary triggers that you have noticed bring on your headaches (this could include aged cheese, peanuts, MSG, aspartame and nitrates)  [x] EXERCISE - physical exercise as we all know is good for you in many ways, and not only is good for your heart, but also is beneficial for your mental health, cognitive health and  chronic pain/headaches  I would encourage at the least 5 days of physical exercise weekly for at least 30 minutes  Education and Follow-up  [x] Please call with any questions or concerns  Of course if any new concerning symptoms go to the emergency department  [x] Follow up 3 months, sooner if needed     - As we discussed if there are no abnormalities on the brain MRI that need urgent intervention (very often there are incidental findings that may not mean anything significant and are better discussed in person), you will not necessarily receive a call from us, but feel free to call in to check on the status of the report (since not knowing can be anxiety provoking) and the nurses will let you know the result or make sure I have nothing to pass on regarding the results first   Ideally, all of this will be discussed in detail in the follow-up visit

## 2022-02-23 ENCOUNTER — TELEPHONE (OUTPATIENT)
Dept: OTHER | Facility: OTHER | Age: 27
End: 2022-02-23

## 2022-02-23 NOTE — TELEPHONE ENCOUNTER
Patient called to provide the  approval/confirmation for the MRI scheduled tomorrow 2/24 @ 7:45     Approval: [id number]

## 2022-02-24 ENCOUNTER — HOSPITAL ENCOUNTER (OUTPATIENT)
Dept: MRI IMAGING | Facility: HOSPITAL | Age: 27
Discharge: HOME | End: 2022-02-24
Attending: PSYCHIATRY & NEUROLOGY
Payer: COMMERCIAL

## 2022-02-24 DIAGNOSIS — R20.0 NUMBNESS AND TINGLING OF RIGHT SIDE OF FACE: ICD-10-CM

## 2022-02-24 DIAGNOSIS — R20.2 NUMBNESS AND TINGLING OF RIGHT SIDE OF FACE: ICD-10-CM

## 2022-02-24 DIAGNOSIS — G44.319 ACUTE POST-TRAUMATIC HEADACHE, NOT INTRACTABLE: ICD-10-CM

## 2022-02-24 DIAGNOSIS — G43.709 CHRONIC MIGRAINE WITHOUT AURA WITHOUT STATUS MIGRAINOSUS, NOT INTRACTABLE: ICD-10-CM

## 2022-02-24 PROCEDURE — A9585 GADOBUTROL INJECTION: HCPCS | Performed by: PSYCHIATRY & NEUROLOGY

## 2022-02-24 PROCEDURE — G1004 CDSM NDSC: HCPCS

## 2022-02-24 PROCEDURE — 70553 MRI BRAIN STEM W/O & W/DYE: CPT

## 2022-02-24 RX ADMIN — GADOBUTROL 5 ML: 604.72 INJECTION INTRAVENOUS at 10:16

## 2022-03-24 ENCOUNTER — TELEPHONE (OUTPATIENT)
Dept: NEUROLOGY | Facility: CLINIC | Age: 27
End: 2022-03-24

## 2022-03-24 NOTE — TELEPHONE ENCOUNTER
Patient calling to ask name of provider he saw at last visit  Also requesting MRI results - reviewed Normal study with him  Patient noted he was unable to  emgality  Call placed to pharmacy         ID: [id number]  GROUP: -  LORI: DENISE  BIN: [id number]  [phone]

## 2022-03-25 NOTE — TELEPHONE ENCOUNTER
PA was started on CMM  Rivero: Ivonne Velarde    PA initiated on CMM    Message from plan:  Additional Information Required  Please contact the members plan for prior authorization request    Created new Key ([employer]) Key: [id number] [facility], [facility]    Initiated on [facility]    Awaiting determination

## 2022-03-29 NOTE — TELEPHONE ENCOUNTER
PA has been approved through 9/28/2022  Called [facility] pharmacy, spoke w/Geri and advised of the approval  She was able to get a paid claim for the loading dose  Copay is $75  Pt made aware  Advised to download the saving card online  Can bring this to the pharmacy and it may lower his copay cost  Pt verbalized understanding

## 2022-05-18 ENCOUNTER — TELEPHONE (OUTPATIENT)
Dept: NEUROLOGY | Facility: CLINIC | Age: 27
End: 2022-05-18

## 2022-05-24 ENCOUNTER — TELEPHONE (OUTPATIENT)
Dept: NEUROLOGY | Facility: CLINIC | Age: 27
End: 2022-05-24

## 2022-05-24 ENCOUNTER — OFFICE VISIT (OUTPATIENT)
Dept: NEUROLOGY | Facility: CLINIC | Age: 27
End: 2022-05-24
Payer: COMMERCIAL

## 2022-05-24 VITALS
WEIGHT: 118.3 LBS | BODY MASS INDEX: 16.94 KG/M2 | DIASTOLIC BLOOD PRESSURE: 62 MMHG | SYSTOLIC BLOOD PRESSURE: 108 MMHG | TEMPERATURE: 97.8 F | HEIGHT: 70 IN

## 2022-05-24 DIAGNOSIS — F41.9 ANXIETY AND DEPRESSION: ICD-10-CM

## 2022-05-24 DIAGNOSIS — F32.A ANXIETY AND DEPRESSION: ICD-10-CM

## 2022-05-24 DIAGNOSIS — G43.709 CHRONIC MIGRAINE WITHOUT AURA WITHOUT STATUS MIGRAINOSUS, NOT INTRACTABLE: Primary | ICD-10-CM

## 2022-05-24 PROCEDURE — 99215 OFFICE O/P EST HI 40 MIN: CPT | Performed by: PSYCHIATRY & NEUROLOGY

## 2022-05-24 RX ORDER — RIZATRIPTAN BENZOATE 10 MG/1
10 TABLET ORAL ONCE AS NEEDED
Qty: 9 TABLET | Refills: 6 | Status: SHIPPED | OUTPATIENT
Start: 2022-05-24

## 2022-05-24 NOTE — PROGRESS NOTES
Review of Systems   Constitutional: Negative  Negative for appetite change and fever  HENT: Negative  Negative for hearing loss, tinnitus, trouble swallowing and voice change  Eyes: Negative  Negative for photophobia and pain  Respiratory: Negative  Negative for shortness of breath  Cardiovascular: Negative  Negative for palpitations  Gastrointestinal: Negative  Negative for nausea and vomiting  Endocrine: Negative  Negative for cold intolerance  Genitourinary: Negative  Negative for dysuria, frequency and urgency  Musculoskeletal: Negative  Negative for myalgias and neck pain  Skin: Negative  Negative for rash  Neurological: Positive for dizziness, numbness (right side of the face) and headaches  Negative for tremors, seizures, syncope, facial asymmetry, speech difficulty, weakness and light-headedness  Hematological: Negative  Does not bruise/bleed easily  Psychiatric/Behavioral: Positive for sleep disturbance  Negative for confusion and hallucinations  Memory concerns   States hes unable to remember thing from a few minutes earlier or some ADL's as well as simple things that he usually does daily like feeding his cats

## 2022-05-24 NOTE — PROGRESS NOTES
[facility] Consult - Follow up   PATIENT:  Katerin Urena  MRN:  [id number]  :  1995  DATE OF SERVICE:  2022  REFERRED BY: No ref  provider found  PMD: No primary care provider on file  Assessment/Plan:   Tru Pat II is a delightful 23 y o  male with a past medical history that includes fibromyalgia, anxiety, depression, migraine, hypersomnia, dyslipidemia, medical marijuana use, posttraumatic headache, fatigue, 20 head trauma with right side laceration with chronic paresthesias referred here for evaluation of headache  My initial evaluation 2022     Chronic migraine without aura without status migrainosus, not intractable  Numbness and tingling of right side of face  Anxiety and depression   He has a long history of chronic headaches and migraines dating back to childhood  Family history of headaches in both parents  He also has history of chronic pain syndrome at other areas of the body  He reports head pain typically may start within 30 minutes of waking and can build to the day be coming often most significant about 4-6 hours into the day  Often starts with right facial paresthesias and radiates back along the right side of the head with allodynia and hyperesthesia  Also has left-sided head pressure  He denies aura and reports typical associated migrainous features without unilateral autonomic features to suggest trigeminal autonomic cephalalgia  - as of 2022 he reports headaches have been every day for at least the past 1-2 years, worse on average 15 days per month or more  Do of course get worse temporarily after hitting his head  The paresthesias seemed to start after scalp laceration incident 2020, however will evaluate to ensure no intracranial cause  Trial of Emgality for prevention    - as of 2022: normal MRI brain reviewed, no structural explanation for his headaches or paresthesias of face   He has thus far had two months of emgality, does not think he has had significant improvement yet, but would give it more time  Needs to follow-up with sleep medicine and mental healthcare team and referral placed again  Not interested in additional prescription preventative  Trial of rizatriptan for abortive  Workup:  - Neurologic assessment reveals normal neurological exam, except for decreased sensation right face V2 V3 distribution and hyperesthesias V1 distribution where previous trauma was   - MRI brain cavernous/trigeminal with without contrast 02/24/2022: Normal MRI of the brain  Dedicated imaging of the brainstem and trigeminal nerves is unremarkable  Preventative:  - we discussed headache hygiene and lifestyle factors that may improve headaches, including following up with sleep medicine for previously ordered MLST test, mental health care, decreasing caffeine  - continue emgality for 3-6 month trial  Discussed proper use, possible side effects and risks  - Currently on through other providers: medical marijuana   - Past/ failed/contraindicated: Amitriptyline, Venlafaxine, Gabapentin, Propranolol     - future options: alternative CGRP med, botox    Abortive:  - discussed not taking over-the-counter or prescription pain medications more than 3 days per week to prevent medication overuse/rebound headache - typically exedrin helps   - trial rizatriptan 10 mg as migraine abortive  Discussed proper use, possible side effects and risks  - Past/ failed/contraindicated: OTC meds   - future options:  Could consider triptan, prochlorperazine, Toradol IM or p o , could consider trial for 5 days of Depakote or dexamethasone for prolonged migraine, ubrelvy, reyvow, nurtec      H/O concussion - resolved  He has followed with other providers in the past for concussion  Most recently 01/22/2022 he accidentally fell down the steps and hit his right forehead and worsening of his daily chronic headaches and dizziness  Could have been a concussion versus posttraumatic headache with migrainous features  We discussed regardless, at this time concussion would likely be over  He subsequently has followed up with primary care provider and [facility] NP team       We have discussed concussions and the natural course of recovery  We have discussed that symptoms from a concussion typically take 2 weeks to resolve, and although sometimes it can feel like concussion symptoms linger on, at this point these symptoms would be related to contributing factors  - Contributing factors may include:   Post traumatic stress, Prolonged removal from normal routine,  posttraumatic headache,  comorbid injuries, preexisting chronic headaches or migraines,  medication overuse headache, anxiety or depression, stress, deconditioning,  comorbid medical diagnoses, young age  - I have recommended gradual return of normal cognitive and physical activity with safety precautions  - We discussed that newer research regarding concussion shows that the sooner one returns gradually to their normal physical and cognitive routine, the sooner one tends to recover   Prolonged removal from normal routine and deconditioning have been shown to prolong symptoms and worsen symptoms   - We discussed that sometimes there is a constellation of symptoms that some refer to as "post concussion syndrome," but I prefer not to use this term since that can be misleading and make people think they are still brain injured or "concussed," when the most common and likely etiology this far out from the head trauma is either contributing factors or a form of functional neurologic disorder with mixed symptoms  - We discussed how cognitive issues can have multiple causes and often related to multifactorial etiologies including stress, anxiety,  mood, pain, hypervigilance  and sleep issues and provided reassurance that, it is not likely the cognitive dysfunction is related to concussion at this point    - Safe driving precautions, should not drive at all if feeling sleepy or cognitively not well  * We discussed the most likely therapy to help in these cases would be counseling  I do not typically recommend cognitive therapy, vision therapy or other specific concussion therapies once concussion is over  Hypersomnia, sleep study 2016-for [facility], MLST ordered and not completed  -     Ambulatory referral to Sleep Medicine placed 2/16/2022    Anxiety and depression  -     Ambulatory referral to [facility]; Future  -     Ambulatory referral to Psychiatry; Future    Patient instructions      Referral to psychiatry and counseling for mood which is likely contributing to symptoms   Will have our  reach out to with a list of counselors covered by your insurance to see if this helps  Please follow up with Referral to sleep medicine placed 2/16/2022    Consider the book "the Body keeps the Score" interesting book on post traumatic stress    [employer] - Download this free [employer] on your phone (they will offer subscription, but you do not have to do this)  - I recommend listening to the first approximately 5 or so lectures (more if you want) that are about 10-20 minutes long on the neuroscience of pain  - They discuss how pain works in the brain and steps to try and cure chronic pain    I recommend these free lectures from [employer]  "The basic neuroscience of pain"  "Pain is more than just tissue damage"  "How pain becomes chronic"  "What does the pain mean to you"  "What to do next"      Headache/migraine treatment:   Abortive medications (for immediate treatment of a headache): It is ok to take ibuprofen, acetaminophen or naproxen (Advil, Tylenol,  Aleve, Excedrin) if they help your headaches you should limit these to No more than 3 times a week to avoid medication overuse/rebound headaches       For your more moderate to severe migraines take this medication early   Maxalt (rizatriptan) 10mg tabs - take one at the onset of headache  May repeat one time after 1-2 hours if pain has not resolved  (Max 2 a day and 9 a month)     Prescription preventive medications for headaches/migraines   (to take every day to help prevent headaches - not to take at the time of headache):  [x] Emgality/Galcanezumab -  120 mg injections every 30 days     READ INSTRUCTIONS that come with the medication  REFRIGERATE  Keep out of direct sunlight  Prior to administration, allow to come to room temperature for 30 minutes  Do not warm using a heat source (eg, microwave or hot water)  Do not shake  Administer in preferably abdomen (avoiding 2 inches around the navel), thigh, upper arm, or buttocks avoiding areas of skin that are tender, bruised, red or hard  Deliver entire contents of single-use prefilled pen or syringe  *Typically these types of medications take time untill you see the benefit, although some may see improvement in days, often it may take weeks, especially if the medication is being titrated up to a beneficial level  Please contact us if there are any concerns or questions regarding the medication  Lifestyle Recommendations:  [x] SLEEP - Maintain a regular sleep schedule: Adults need at least 7-8 hours of uninterrupted a night  Maintain good sleep hygiene:  Going to bed and waking up at consistent times, avoiding excessive daytime naps, avoiding caffeinated beverages in the evening, avoid excessive stimulation in the evening and generally using bed primarily for sleeping  One hour before bedtime would recommend turning lights down lower, decreasing your activity (may read quietly, listen to music at a low volume)  When you get into bed, should eliminate all technology (no texting, emailing, playing with your phone, iPad or tablet in bed)  [x] HYDRATION - Maintain good hydration  Drink  2L of fluid a day (4 typical small water bottles)  [x] DIET - Maintain good nutrition   In particular don't skip meals and try and eat healthy balanced meals regularly  [x] TRIGGERS - Look for other triggers and avoid them: Limit caffeine to 1-2 cups a day or less  Avoid dietary triggers that you have noticed bring on your headaches (this could include aged cheese, peanuts, MSG, aspartame and nitrates)  [x] EXERCISE - physical exercise as we all know is good for you in many ways, and not only is good for your heart, but also is beneficial for your mental health, cognitive health and  chronic pain/headaches  I would encourage at the least 5 days of physical exercise weekly for at least 30 minutes  Education and Follow-up  [x] Please call with any questions or concerns  Of course if any new concerning symptoms go to the emergency department  [x] Follow up 3 months, sooner if needed         CC: We had the pleasure of evaluating Vaibhav Davis II in neurological consultation today  Vaibhav Daivs II is a   right handed male who presents today for evaluation of headaches  History obtained from patient as well as available medical record review  History of Present Illness:   Interval history as of 5/24/2022  - he has not followed up with sleep medicine as referred    - 2/19/22 ED for N/V and abdominal pain and told cannabinoid hyperemesis syndrome and he feels this is not the issue, this is better now  - MRI brain cavernous/trigeminal with without contrast 02/24/2022: Normal MRI of the brain  Dedicated imaging of the brainstem and trigeminal nerves is unremarkable      Headaches and migraines   Continues have daily headaches and migraines, worse 2-3 days a week     Preventative:   - trial of emgality - called 6 weeks later and had not gotten emgality yet, got it that day and had 2 shots, 4/27/22 and due soon   Denies bothersome side effects     Abortive: exedrin rarely     - mood swings, anger outbursts at times  - short term memory issues occasionally, sometimes will forgets to feed the cats or do the litter, no driving safety issues   PHQ-2/9 Depression Screening    Little interest or pleasure in doing things: 2 - more than half the days  Feeling down, depressed, or hopeless: 1 - several days  Trouble falling or staying asleep, or sleeping too much: 3 - nearly every day  Feeling tired or having little energy: 2 - more than half the days  Poor appetite or overeating: 3 - nearly every day  Feeling bad about yourself - or that you are a failure or have let yourself or your family down: 3 - nearly every day  Trouble concentrating on things, such as reading the newspaper or watching television: 3 - nearly every day  Moving or speaking so slowly that other people could have noticed  Or the opposite - being so fidgety or restless that you have been moving around a lot more than usual: 1 - several days  Thoughts that you would be better off dead, or of hurting yourself in some way: 0 - not at all  PHQ-2 Score: 3  PHQ-2 Interpretation: POSITIVE depression screen  PHQ-9 Score: 18   PHQ-9 Interpretation: Moderately severe depression            History as of initial visit 02/16/2022: On 01/22/2022 he accidentally fell down the steps at his house and landed at the bottom hitting his head on the hardwood railing  Hit right forehead  Acute symptoms included:  No LOC, no amnesia, headaches, dizzy  Stayed home from work 3 days  Followed up with PCP 01/26/2022    he has followed with [facility] NP team in the past, see EMR   - most recent visit 1/27/22    Headaches started at what age? 89 years old  How often do the headaches occur?   - as of 2/16/2022: 1-2 years of every day, bad every other day   What time of the day do the headaches start? Not there when he wakes for 30 mins and builds throughout the day   How long do the headaches last? All day, 4-6 hour after waking worse  Are you ever headache free?  Yes    Aura? without aura     Last eye exam: in 2021, no other issues     Where is your headache located and pain quality?   - right side of head, right forehead, right parietal, right face  - V1-V3 numb tingling and then works back along right side and hair feels heavy  - then left side of head pressure      What is the intensity of pain? Average: 3-6/10, worst 9-10/10  Associated symptoms:   [x] Nausea       [x] Vomiting - can happen for 2 days in a row and then not for 3 months    [x] Problems with concentration  [x] Photophobia >    [x]Phonophobia      [] Osmophobia  [x] Blurred vision   [x] Light-headed or dizzy     [] Tinnitus    [x] right facial paresthesias and no where else   [] Ptosis      [] Facial droop  [] Lacrimation  [] Nasal congestion/rhinorrhea  - chronic congestion       Things that make the headache worse? No specific movements, any quick movement     Headache triggers:  Stress, caffeine withdrawal, anything can trigger it    Have you seen someone else for headaches or pain? Yes   Have you ever had any Brain imaging? CT scan     What medications do you take or have you taken for your headaches?    ABORTIVE:      exedrin every other day, brings it down and able to function   Acetaminophen  Ibuprofen    Past  Tylenol 3 with codeine     PREVENTIVE:       Medical marijuana every day     Past/ failed/contraindicated:  Amitriptyline he thinks  Venlafaxine  Gabapentin   Propranolol     -      LIFESTYLE  Sleep - chronic hypersomnia - sleep study 12/2016  - no significant sleep apnea, AHI 3 5, MLST ordered  - averages: 7-10 hours    Water: a gallon per day  Caffeine: 2 energy drinks per day    Mood: anxiety, depression, never with psychiatry, "ok"  Counselor a long time ago     The following portions of the patient's history were reviewed and updated as appropriate: allergies, current medications, past family history, past medical history, past social history, past surgical history and problem list     Pertinent family history:  Family history of headaches:  headaches of unknown type in mother and father  Any family history of aneurysms - Not first gen - dad's step dad    Pertinent social history:  Work: not currently   - as of 2/16/2022 he reports he was previously was driving a type of forklift-pallet jack  No longer working, stopped 3 weeks ago, they did not fire him he states but "let him go"? 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[employer] [facility] is currently employed  Education: HS  Lives with two best friends and his fiance, cats       Past Medical History:   - 10/7/13   - 3/31/15   - 12/26/2020, he was on a tire swing in a garage when the swing rope broke and he fell hitting his head on a   tool box  He had a large facial/scalp laceration that was bleeding  He was transported to [facility] for evaluation  Acute symptoms included:  Headache, neck pain, nausea  He was evaluated in the ER were laceration was repaired noncontrast head CT was unremarkable for acute pathology  He reported headache, neck pain and nausea    He subsequently followed up with [facility] NP team who  referred to vestibular, cognitive therapy, neuro optometry   - CT head 1/8/2021 normal     Past Medical History:     Past Medical History:   Diagnosis Date    Cellulitis of finger of left hand 4/17/2018    Head injury     Pneumothorax on left 4/17/2018    Tobacco abuse 4/17/2018       Patient Active Problem List   Diagnosis    Chest pain    Pneumothorax on left    Tobacco abuse    Cellulitis of finger of left hand       Medications:      Current Outpatient Medications   Medication Sig Dispense Refill    acetaminophen (TYLENOL) 325 mg tablet Take 650 mg by mouth every 6 (six) hours as needed      Galcanezumab-gnlm 120 MG/ML SOAJ Inject 120 mg under the skin every 30 (thirty) days Following the first month loading dose of 2 pens  1 mL 11    ibuprofen (MOTRIN) 200 mg tablet Take 2 tablets (400 mg total) by mouth every 8 (eight) hours as needed for mild pain or moderate pain      NON FORMULARY Medical Marijuana      rizatriptan (MAXALT) 10 MG tablet Take 1 tablet (10 mg total) by mouth once as needed for migraine May repeat in 2 hours if needed  Max 2/24 hours, 9/month  9 tablet 6     No current facility-administered medications for this visit  Allergies:       Allergies   Allergen Reactions    [place] Meal - Food Allergy     Banana - Food Allergy     Nickel Other (See Comments)     "Throat closes"       Family History:     Family History   Problem Relation Age of Onset   Mami Crystal Breast cancer Mother     Heart disease Father     Hypertension Father     Hyperlipidemia Father     Cancer Paternal Grandmother        Social History:     Social History     Socioeconomic History    Marital status: Single     Spouse name: Not on file    Number of children: Not on file    Years of education: Not on file    Highest education level: Not on file   Occupational History    Not on file   Tobacco Use    Smoking status: Former Smoker    Smokeless tobacco: Never Used   Vaping Use    Vaping Use: Every day    Substances: Nicotine, Flavoring   Substance and Sexual Activity    Alcohol use: Yes     Comment: social    Drug use: Yes     Types: Marijuana     Comment: medical marijuana    Sexual activity: Not on file   Other Topics Concern    Not on file   Social History Narrative    Not on file     Social Determinants of Health     Financial Resource Strain: Not on file   Food Insecurity: Not on file   Transportation Needs: Not on file   Physical Activity: Not on file   Stress: Not on file   Social Connections: Not on file   Intimate Partner Violence: Not on file   Housing Stability: Not on file         Objective:       Physical Exam: Vitals:            Constitutional:    /62 (BP Location: Left arm, Patient Position: Sitting, Cuff Size: Standard)   Temp 97 8 °F (36 6 °C) (Tympanic)   Ht 5' 10" (1 778 m)   Wt 53 7 kg (118 lb 4 8 oz)   BMI 16 97 kg/m²   BP Readings from Last 3 Encounters:   05/24/22 108/62   02/16/22 121/64   04/18/18 121/72     Pulse Readings from Last 3 Encounters:   02/16/22 83   06/22/20 67   04/18/18 (!) 46         Well developed, well nourished, well groomed  No dysmorphic features  HEENT:  Normocephalic atraumatic  See neuro exam   Chest:  Respirations appear regular and unlabored  Cardiovascular:  no observed significant swelling  Musculoskeletal:  (see below under neurologic exam for evaluation of motor function and gait)   Skin:  warm and dry, not diaphoretic  Psychiatric:  Depressed mood and affect        Neurological Examination:     Mental status/cognitive function:   Recent and remote memory intact  Attention span and concentration as well as fund of knowledge are appropriate for age  Normal language and spontaneous speech  Cranial Nerves:  VIII-hearing grossly intact bilaterally   Motor Exam: symmetric bulk throughout  no atrophy, fasciculations or abnormal movements noted  Coordination:  no apparent dysmetria, ataxia or tremor noted  Gait: steady casual gait      Pertinent lab results:   See EMR for recent labs  - 04/13/2021 CBC unremarkable except for WBC 10 6  CMP unremarkable except for calcium 10 2  TSH normal 0 45  - 09/21/19 ESR 3, CRP <3    Imaging: I have personally reviewed imaging and radiology read   - MRI brain cavernous/trigeminal with without contrast 02/24/2022: Normal MRI of the brain  Dedicated imaging of the brainstem and trigeminal nerves is unremarkable  Review of Systems:   ROS obtained by medical assistant Personally reviewed and updated if indicated  I recommended PCP follow up for non neurologic problems  Review of Systems   Constitutional: Negative  Negative for appetite change and fever  HENT: Negative  Negative for hearing loss, tinnitus, trouble swallowing and voice change  Eyes: Negative  Negative for photophobia and pain  Respiratory: Negative  Negative for shortness of breath  Cardiovascular: Negative  Negative for palpitations  Gastrointestinal: Negative  Negative for nausea and vomiting  Endocrine: Negative  Negative for cold intolerance  Genitourinary: Negative  Negative for dysuria, frequency and urgency  Musculoskeletal: Negative  Negative for myalgias and neck pain  Skin: Negative  Negative for rash  Neurological: Positive for  numbness (right side of the face) and headaches  Negative for tremors, seizures, syncope, facial asymmetry, speech difficulty, weakness and light-headedness  Hematological: Negative  Does not bruise/bleed easily  Psychiatric/Behavioral: Positive for sleep disturbance  Negative for confusion and hallucinations  I have spent 28 minutes with Patient  today in which greater than 50% of this time was spent in counseling/coordination of care  I also spent 16 minutes non face to face for this patient the same day         Author:  Sharad Arroyo MD 5/24/2022 1:01 PM

## 2022-05-24 NOTE — PATIENT INSTRUCTIONS
Referral to psychiatry and counseling for mood which is likely contributing to symptoms     Please follow up with Referral to sleep medicine placed 2/16/2022    Consider the book "the Body keeps the Score" interesting book on post traumatic stress    Curable - Download this free Santos on your phone (they will offer subscription, but you do not have to do this)  - I recommend listening to the first approximately 5 or so lectures (more if you want) that are about 10-20 minutes long on the neuroscience of pain  - They discuss how pain works in the brain and steps to try and cure chronic pain    I recommend these free lectures from [employer]  "The basic neuroscience of pain"  "Pain is more than just tissue damage"  "How pain becomes chronic"  "What does the pain mean to you"  "What to do next"      Headache/migraine treatment:   Abortive medications (for immediate treatment of a headache): It is ok to take ibuprofen, acetaminophen or naproxen (Advil, Tylenol,  Aleve, Excedrin) if they help your headaches you should limit these to No more than 3 times a week to avoid medication overuse/rebound headaches  For your more moderate to severe migraines take this medication early   Maxalt (rizatriptan) 10mg tabs - take one at the onset of headache  May repeat one time after 1-2 hours if pain has not resolved  (Max 2 a day and 9 a month)     Prescription preventive medications for headaches/migraines   (to take every day to help prevent headaches - not to take at the time of headache):  [x] Emgality/Galcanezumab -  120 mg injections every 30 days     READ INSTRUCTIONS that come with the medication  REFRIGERATE  Keep out of direct sunlight  Prior to administration, allow to come to room temperature for 30 minutes  Do not warm using a heat source (eg, microwave or hot water)  Do not shake   Administer in preferably abdomen (avoiding 2 inches around the navel), thigh, upper arm, or buttocks avoiding areas of skin that are tender, bruised, red or hard  Deliver entire contents of single-use prefilled pen or syringe  *Typically these types of medications take time untill you see the benefit, although some may see improvement in days, often it may take weeks, especially if the medication is being titrated up to a beneficial level  Please contact us if there are any concerns or questions regarding the medication  Lifestyle Recommendations:  [x] SLEEP - Maintain a regular sleep schedule: Adults need at least 7-8 hours of uninterrupted a night  Maintain good sleep hygiene:  Going to bed and waking up at consistent times, avoiding excessive daytime naps, avoiding caffeinated beverages in the evening, avoid excessive stimulation in the evening and generally using bed primarily for sleeping  One hour before bedtime would recommend turning lights down lower, decreasing your activity (may read quietly, listen to music at a low volume)  When you get into bed, should eliminate all technology (no texting, emailing, playing with your phone, iPad or tablet in bed)  [x] HYDRATION - Maintain good hydration  Drink  2L of fluid a day (4 typical small water bottles)  [x] DIET - Maintain good nutrition  In particular don't skip meals and try and eat healthy balanced meals regularly  [x] TRIGGERS - Look for other triggers and avoid them: Limit caffeine to 1-2 cups a day or less  Avoid dietary triggers that you have noticed bring on your headaches (this could include aged cheese, peanuts, MSG, aspartame and nitrates)  [x] EXERCISE - physical exercise as we all know is good for you in many ways, and not only is good for your heart, but also is beneficial for your mental health, cognitive health and  chronic pain/headaches  I would encourage at the least 5 days of physical exercise weekly for at least 30 minutes  Education and Follow-up  [x] Please call with any questions or concerns   Of course if any new concerning symptoms go to the emergency department    [x] Follow up 3 months, sooner if needed

## 2022-05-24 NOTE — TELEPHONE ENCOUNTER
MSW phoned patient who informed that he is interested in receiving list of in network psychologist via email he confirmed email to be     [email or web address]    Patient is aware that MSW will be sending the list via email tomorrow

## 2022-05-24 NOTE — TELEPHONE ENCOUNTER
----- Message from Puneet Rodas MD sent at 5/24/2022 12:27 PM EDT -----  Could you please help this patient with a list of therapists or psychologists covered by their insurance?

## 2022-05-25 NOTE — TELEPHONE ENCOUNTER
email with list of in network counselors was sent to patient via mail  MSW remains available for any questions or future social needs

## 2022-05-25 NOTE — TELEPHONE ENCOUNTER
Easton Burns,    Please see below list of counselors in the area that are in network with your insurance  When selecting the provider of your choice, please confirm that they continue to be in network with your insurance  Please contact me if you have any questions or if I can apply any additional assistance  [facility]  [address], [place], [address]  Phone: [address] [facility]  [address] [place], [place], [address]  Phone: [phone]    [facility] for [address] [facility] and Families  [address] [facility], [place], [address]  Phone: [address]  [facility], [place], [address]  Phone: [phone]    [facility]  [address] [facility], [place], [address]  Phone: [address]  [facility], [place], [address]  Phone: [address]  [facility], [place], [address]  Phone: [phone]    Ananya Márquez   [address] [facility], [place], [address]  Phone: [phone]    [facility], [place], [address] [facility]  Phone: [address] [facility], [place], [address]  Phone: [phone]    [facility] and [address]  [facility]  [address], [place], [address]  Phone: Nate Cabral  [address] [facility] [employer], [address]  Phone: [phone],      EMILY Ordonez     [address] [facility]  [address] [place] [address]  [phone] phone/ [phone] fax  www [facility]  org

## 2022-07-01 ENCOUNTER — TELEPHONE (OUTPATIENT)
Dept: PSYCHIATRY | Facility: CLINIC | Age: 27
End: 2022-07-01
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
## 2022-07-08 ENCOUNTER — SOCIAL WORK (OUTPATIENT)
Dept: BEHAVIORAL/MENTAL HEALTH CLINIC | Facility: CLINIC | Age: 27
End: 2022-07-08
Payer: COMMERCIAL

## 2022-07-08 DIAGNOSIS — F41.1 GAD (GENERALIZED ANXIETY DISORDER): Primary | ICD-10-CM

## 2022-07-08 DIAGNOSIS — F33.2 SEVERE EPISODE OF RECURRENT MAJOR DEPRESSIVE DISORDER, WITHOUT PSYCHOTIC FEATURES (HCC): ICD-10-CM

## 2022-07-08 DIAGNOSIS — F41.9 ANXIETY AND DEPRESSION: ICD-10-CM

## 2022-07-08 DIAGNOSIS — F32.A ANXIETY AND DEPRESSION: ICD-10-CM

## 2022-07-08 PROBLEM — F33.9 EPISODE OF RECURRENT MAJOR DEPRESSIVE DISORDER (HCC): Status: ACTIVE | Noted: 2022-07-08

## 2022-07-08 PROCEDURE — 90791 PSYCH DIAGNOSTIC EVALUATION: CPT | Performed by: COUNSELOR
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
## 2022-07-11 NOTE — TELEPHONE ENCOUNTER
[address] [facility]  Phone: [address] [facility]  [address] [place], [address]  Phone: [phone]    [facility] [address] and Families  [address], [address]  Phone: [address]  [facility], [address]  Phone: [phone]    [facility]  [address] [facility], [address]  Phone: [address]  [facility], [address]  Phone: [address]  [address], [address]  Phone: [phone]    [facility]  [address] [facility], [address]  Phone: [phone]    [facility] - [address]  Phone: [phone]    Liban Walker  [address], [address]  Phone: [phone]    [facility] and [address]  [address] [facility], [address]  Phone: Nate Cabral  [address] [facility], [address]  Phone: [phone] Oral Minoxidil Pregnancy And Lactation Text: This medication should only be used when clearly needed if you are pregnant, attempting to become pregnant or breast feeding.

## 2022-07-22 ENCOUNTER — SOCIAL WORK (OUTPATIENT)
Dept: BEHAVIORAL/MENTAL HEALTH CLINIC | Facility: CLINIC | Age: 27
End: 2022-07-22
Payer: COMMERCIAL

## 2022-07-22 DIAGNOSIS — F41.1 GAD (GENERALIZED ANXIETY DISORDER): Primary | ICD-10-CM

## 2022-07-22 DIAGNOSIS — F33.2 SEVERE EPISODE OF RECURRENT MAJOR DEPRESSIVE DISORDER, WITHOUT PSYCHOTIC FEATURES (HCC): ICD-10-CM

## 2022-07-22 PROCEDURE — 90834 PSYTX W PT 45 MINUTES: CPT | Performed by: COUNSELOR

## 2022-07-22 NOTE — BH TREATMENT PLAN
Meagan Martinez II  1995       Date of Initial Treatment Plan: 7/22/2022   Date of Current Treatment Plan: 07/22/22    Treatment Plan Number 1    Strengths/Personal Resources for Self Care: fly, mountain bike, working on cars, his cats    Diagnosis:   1  CHANDU (generalized anxiety disorder)     2  Severe episode of recurrent major depressive disorder, without psychotic features ([facility] )         Area of Needs: coping skills to manage stress      Long Term Goal 1: Soham Hou will minimize his stress in his life 3/7 days a week  Target Date: 1/18/2023  Completion Date: TBD         Short Term Objectives for Goal 1:    A: Rousseaueladia Hou will discuss his intrusive thoughts and change them to positive ones   B: Rousseaueladia Hou will discuss his triggers to his stress   C: Soham Hou will problem solve with some of the stress in his life   D: Soham Hou will learn coping skills to manage his stress      GOAL 1: Modality: Individual 2x per month   Completion Date TBD and The person(s) responsible for carrying out the plan is  Soham Hou and [address]: Diagnosis and Treatment Plan explained to Dahlia Mcpherson relates understanding diagnosis and is agreeable to Treatment Plan  Client Comments : Please share your thoughts, feelings, need and/or experiences regarding your treatment plan: He doesn't want to be stressed out anymore  He will snap on others because he's stressed out

## 2022-07-22 NOTE — PSYCH
Psychotherapy Provided: Individual Psychotherapy 45 minutes     Length of time in session: 45 minutes, follow up in 2 week    Encounter Diagnosis     ICD-10-CM    1  CHANDU (generalized anxiety disorder)  F41 1    2  Severe episode of recurrent major depressive disorder, without psychotic features ([facility] )  F33 2        Goals addressed in session: Goal 1     Pain:      none    0    Current suicide risk : Low     D: Alejandro Melendez stated that he was in the hospital this past week for kidney failure  He was throwing up a lot and blood  He reported that he doesn't feel the hospital was supportive and that they blamed his marijuana usage  He expressed that it was a difficult week for him and he only worked once this past week  His parents try to be supportive, but that his dad isn't always supportive  He is feeling stressed as he needs money and can't work  He is going to apply for disability  We discussed some of his concussions and processed through some events that happened in his life  This provider created the treatment plan with Grant PADGETT: Alejandro Melendez seemed frustrated when discussing his health issues as he hasn't felt supported by the doctors  This has been going on for a while and he doesn't feel he's been treated fairly  He was mood congruent, oriented x3, denied SI, SIB, HI    P: This provider will continue working on the identified goals  Behavioral Health Treatment Plan [facility]: Diagnosis and Treatment Plan explained to Candy Cortes relates understanding diagnosis and is agreeable to Treatment Plan   Yes

## 2022-08-19 ENCOUNTER — SOCIAL WORK (OUTPATIENT)
Dept: BEHAVIORAL/MENTAL HEALTH CLINIC | Facility: CLINIC | Age: 27
End: 2022-08-19
Payer: COMMERCIAL

## 2022-08-19 DIAGNOSIS — F33.2 SEVERE EPISODE OF RECURRENT MAJOR DEPRESSIVE DISORDER, WITHOUT PSYCHOTIC FEATURES (HCC): ICD-10-CM

## 2022-08-19 DIAGNOSIS — F41.1 GAD (GENERALIZED ANXIETY DISORDER): Primary | ICD-10-CM

## 2022-08-19 PROCEDURE — 90834 PSYTX W PT 45 MINUTES: CPT | Performed by: COUNSELOR

## 2022-08-19 NOTE — PSYCH
Psychotherapy Provided: Individual Psychotherapy 45 minutes     Length of time in session: 45 minutes, follow up in 2 week    Encounter Diagnosis     ICD-10-CM    1  CHANDU (generalized anxiety disorder)  F41 1    2  Severe episode of recurrent major depressive disorder, without psychotic features ([facility] [address] )  F33 2        Goals addressed in session: Goal 1     Pain:      none    0    Current suicide risk : Low     D: Grant expressed that things have been stressful and he hasn't been able to work as much  We explored his marijuana and caffeine use discussing how this could be affecting him  He shared how he wakes up with headaches everyday and has difficulty doing anything  His anxiety has gotten worst, but he is unable to identify his thoughts due to his concussion  He expressed feeling anxious and recognizing the symptoms, but not understanding why  We discussed how lack of food over the course of the years could be affecting his body as he admitted to eating very little and throwing up daily  He expressed that he has tried to cut back on marijuana for 6 months, but things have gotten worst  He shared that he will see another doctor next week to test how food digests in his body  A: Katey Benson appeared to be annoyed when discussing his issues wanting a fix to the problems  It seems that Katey Benson is in denial that his throwing up is caused by his marijuana usage as he admitted to using daily and several times a day  Katey Benson appeared very malnourished and expressed not eating until 5pm at night as he will often throw up  He seems to have a lot of physical issues that are affecting him mentally  He was oriented x3, denied SI, SIB, HI    P: This provider will continue working on the identified goals  Behavioral Health Treatment Plan [facility]: Diagnosis and Treatment Plan explained to Radha Anderson relates understanding diagnosis and is agreeable to Treatment Plan   Yes

## 2022-09-02 ENCOUNTER — SOCIAL WORK (OUTPATIENT)
Dept: BEHAVIORAL/MENTAL HEALTH CLINIC | Facility: CLINIC | Age: 27
End: 2022-09-02

## 2022-09-02 DIAGNOSIS — F41.1 GAD (GENERALIZED ANXIETY DISORDER): Primary | ICD-10-CM

## 2022-09-02 DIAGNOSIS — F33.2 SEVERE EPISODE OF RECURRENT MAJOR DEPRESSIVE DISORDER, WITHOUT PSYCHOTIC FEATURES (HCC): ICD-10-CM

## 2022-09-02 NOTE — PSYCH
No Call  No Show  No Charge    Deborah Stone II no showed 09/02/22 appointment , staff called and left message to reschedule appointment     Treatment Plan not due at this session

## 2022-09-06 ENCOUNTER — TELEPHONE (OUTPATIENT)
Dept: BEHAVIORAL/MENTAL HEALTH CLINIC | Facility: CLINIC | Age: 27
End: 2022-09-06

## 2022-09-06 NOTE — TELEPHONE ENCOUNTER
NO-SHOW LETTER MAILED TO [employer]    ADDRESS: [address] [place] [phone]

## 2022-09-20 ENCOUNTER — DOCUMENTATION (OUTPATIENT)
Dept: BEHAVIORAL/MENTAL HEALTH CLINIC | Facility: CLINIC | Age: 27
End: 2022-09-20

## 2022-09-20 ENCOUNTER — TELEPHONE (OUTPATIENT)
Dept: BEHAVIORAL/MENTAL HEALTH CLINIC | Facility: CLINIC | Age: 27
End: 2022-09-20

## 2022-09-20 DIAGNOSIS — F33.2 SEVERE EPISODE OF RECURRENT MAJOR DEPRESSIVE DISORDER, WITHOUT PSYCHOTIC FEATURES (HCC): Primary | ICD-10-CM

## 2022-09-20 DIAGNOSIS — F41.1 GAD (GENERALIZED ANXIETY DISORDER): ICD-10-CM

## 2022-09-20 NOTE — PROGRESS NOTES
Assessment/Plan:      Diagnoses and all orders for this visit:    Severe episode of recurrent major depressive disorder, without psychotic features ([facility], [address] )    CHANDU (generalized anxiety disorder)          Subjective:     Patient ID: Luciana Johnson II is a 33 y o  male  Outpatient Discharge Summary:   Admission Date: 7/8/2022  Simba Blanco was referred by Concussion Doctor  Discharge Date: 9/20/2022    Discharge Diagnosis:    1  Severe episode of recurrent major depressive disorder, without psychotic features ([facility], [address] )     2  CHANDU (generalized anxiety disorder)         Treating Physician: n/a  Treatment Complications: n/a  Presenting Problem: Simba Blanco believes he's been dealing with anxiety and depression most of his life, but recently feels it's become a problem with his intrusive thoughts  He was seeing a doctor for his concussion and he's had 17 concussion in his lifetime  He had 2 major concussions spread out over a decade  The most recent was a major one  He moved out from his parents house in 2015 moved into a girlfriends house  They since ended the relationship as he lost his house and animals because of her  His girlfriend at the time cheated on him, was stripping, and she left him leaving the house as he couldn't afford this on his own  He got into another relationship which wasn't mentally stable and he wasn't stable therefore it didn't work out  He's been struggling with intrusive thoughts such as he could slip and die due to hitting his head wrong  He struggles to find other outlets to cope with his intrusive thoughts  He will smoke marijuana recreational at times  He attempted suicide 10 years ago and 5 years ago  He had thoughts that he didn't want to be here  He denied current SI, SA, SIB  He struggles to eat at times and often starves himself  He doesn't get hungry and doesn't want to eat  He struggles with mood swings and will often feel good for a few months and then depressed for another few months   He's been to the ER many times for stress/anxiety, he would throw up a lot, feel numbness in his hands and arms  Recently, he has been connecting the dots and recognizes that he may be struggling more with his mental health that he led himself to believe in the past     Course of treatment includes:    individual therapy   Treatment Progress: fair  Criteria for Discharge: Selma Jang expressed that he was going to "taking other avenues" and asked to be discharged  Aftercare recommendations include continue outpatient therapy  Discharge Medications include:  Current Outpatient Medications:     acetaminophen (TYLENOL) 325 mg tablet, Take 650 mg by mouth every 6 (six) hours as needed, Disp: , Rfl:     Galcanezumab-gnlm 120 MG/ML SOAJ, Inject 120 mg under the skin every 30 (thirty) days Following the first month loading dose of 2 pens  , Disp: 1 mL, Rfl: 11    ibuprofen (MOTRIN) 200 mg tablet, Take 2 tablets (400 mg total) by mouth every 8 (eight) hours as needed for mild pain or moderate pain, Disp: , Rfl:     NON FORMULARY, Medical Marijuana, Disp: , Rfl:     rizatriptan (MAXALT) 10 MG tablet, Take 1 tablet (10 mg total) by mouth once as needed for migraine May repeat in 2 hours if needed   Max 2/24 hours, 9/month , Disp: 9 tablet, Rfl: 6    Prognosis: fair

## 2022-09-20 NOTE — TELEPHONE ENCOUNTER
DISCHARGE LETTER for Simone Martins (certified and regular) placed in outgoing mail on 09/20/22      Article #:  [phone]    Address:  [address] [place] [id number]

## 2022-11-01 ENCOUNTER — TELEPHONE (OUTPATIENT)
Dept: NEUROLOGY | Facility: CLINIC | Age: 27
End: 2022-11-01

## 2022-11-01 NOTE — TELEPHONE ENCOUNTER
Called patient and left voicemail to confirm their upcoming appointment with Dr Fareed Aguilar  Informed patient about arriving in the [place] location 15 minutes prior to appointment to get checked in and go over chart

## 2022-11-08 RX ORDER — PANTOPRAZOLE SODIUM 40 MG/1
40 TABLET, DELAYED RELEASE ORAL 2 TIMES DAILY
COMMUNITY
Start: 2022-08-11

## 2022-11-08 RX ORDER — ONDANSETRON 4 MG/1
4 TABLET, ORALLY DISINTEGRATING ORAL DAILY PRN
COMMUNITY
Start: 2022-08-11

## 2024-01-28 ENCOUNTER — HOSPITAL ENCOUNTER (EMERGENCY)
Facility: HOSPITAL | Age: 29
Discharge: HOME | End: 2024-01-28
Attending: EMERGENCY MEDICINE
Payer: COMMERCIAL

## 2024-01-28 VITALS
RESPIRATION RATE: 16 BRPM | SYSTOLIC BLOOD PRESSURE: 156 MMHG | OXYGEN SATURATION: 98 % | TEMPERATURE: 98.1 F | DIASTOLIC BLOOD PRESSURE: 90 MMHG | HEART RATE: 79 BPM

## 2024-01-28 DIAGNOSIS — R11.2 NAUSEA AND VOMITING: Primary | ICD-10-CM

## 2024-01-28 DIAGNOSIS — D72.829 LEUKOCYTOSIS: ICD-10-CM

## 2024-01-28 LAB
ALBUMIN SERPL BCP-MCNC: 5.1 G/DL (ref 3.5–5)
ALP SERPL-CCNC: 64 U/L (ref 34–104)
ALT SERPL W P-5'-P-CCNC: 50 U/L (ref 7–52)
ANION GAP SERPL CALCULATED.3IONS-SCNC: 28 MMOL/L
AST SERPL W P-5'-P-CCNC: 36 U/L (ref 13–39)
BASOPHILS # BLD MANUAL: 0 THOUSAND/UL (ref 0–0.1)
BASOPHILS NFR MAR MANUAL: 0 % (ref 0–1)
BILIRUB SERPL-MCNC: 0.62 MG/DL (ref 0.2–1)
BUN SERPL-MCNC: 16 MG/DL (ref 5–25)
CALCIUM SERPL-MCNC: 9.9 MG/DL (ref 8.4–10.2)
CHLORIDE SERPL-SCNC: 99 MMOL/L (ref 96–108)
CO2 SERPL-SCNC: 14 MMOL/L (ref 21–32)
CREAT SERPL-MCNC: 1 MG/DL (ref 0.6–1.3)
EOSINOPHIL # BLD MANUAL: 0.34 THOUSAND/UL (ref 0–0.4)
EOSINOPHIL NFR BLD MANUAL: 1 % (ref 0–6)
ERYTHROCYTE [DISTWIDTH] IN BLOOD BY AUTOMATED COUNT: 13.3 % (ref 11.6–15.1)
GFR SERPL CREATININE-BSD FRML MDRD: 102 ML/MIN/1.73SQ M
GLUCOSE SERPL-MCNC: 63 MG/DL (ref 65–140)
HCT VFR BLD AUTO: 44.2 % (ref 36.5–49.3)
HGB BLD-MCNC: 14.6 G/DL (ref 12–17)
LIPASE SERPL-CCNC: 24 U/L (ref 11–82)
LYMPHOCYTES # BLD AUTO: 16 % (ref 14–44)
LYMPHOCYTES # BLD AUTO: 5.41 THOUSAND/UL (ref 0.6–4.47)
MCH RBC QN AUTO: 30.5 PG (ref 26.8–34.3)
MCHC RBC AUTO-ENTMCNC: 33 G/DL (ref 31.4–37.4)
MCV RBC AUTO: 93 FL (ref 82–98)
MONOCYTES # BLD AUTO: 1.69 THOUSAND/UL (ref 0–1.22)
MONOCYTES NFR BLD: 5 % (ref 4–12)
NEUTROPHILS # BLD MANUAL: 26.37 THOUSAND/UL (ref 1.85–7.62)
NEUTS BAND NFR BLD MANUAL: 5 % (ref 0–8)
NEUTS SEG NFR BLD AUTO: 73 % (ref 43–75)
PLATELET # BLD AUTO: 295 THOUSANDS/UL (ref 149–390)
PLATELET BLD QL SMEAR: ADEQUATE
PMV BLD AUTO: 11.8 FL (ref 8.9–12.7)
POTASSIUM SERPL-SCNC: 3.7 MMOL/L (ref 3.5–5.3)
PROT SERPL-MCNC: 8.4 G/DL (ref 6.4–8.4)
RBC # BLD AUTO: 4.78 MILLION/UL (ref 3.88–5.62)
RBC MORPH BLD: NORMAL
SODIUM SERPL-SCNC: 141 MMOL/L (ref 135–147)
WBC # BLD AUTO: 33.81 THOUSAND/UL (ref 4.31–10.16)

## 2024-01-28 PROCEDURE — 85027 COMPLETE CBC AUTOMATED: CPT | Performed by: EMERGENCY MEDICINE

## 2024-01-28 PROCEDURE — 80053 COMPREHEN METABOLIC PANEL: CPT | Performed by: EMERGENCY MEDICINE

## 2024-01-28 PROCEDURE — 36415 COLL VENOUS BLD VENIPUNCTURE: CPT | Performed by: EMERGENCY MEDICINE

## 2024-01-28 PROCEDURE — 85007 BL SMEAR W/DIFF WBC COUNT: CPT | Performed by: EMERGENCY MEDICINE

## 2024-01-28 PROCEDURE — 96374 THER/PROPH/DIAG INJ IV PUSH: CPT

## 2024-01-28 PROCEDURE — 96361 HYDRATE IV INFUSION ADD-ON: CPT

## 2024-01-28 PROCEDURE — 83690 ASSAY OF LIPASE: CPT | Performed by: EMERGENCY MEDICINE

## 2024-01-28 PROCEDURE — 99283 EMERGENCY DEPT VISIT LOW MDM: CPT

## 2024-01-28 PROCEDURE — 96372 THER/PROPH/DIAG INJ SC/IM: CPT

## 2024-01-28 PROCEDURE — 99284 EMERGENCY DEPT VISIT MOD MDM: CPT | Performed by: EMERGENCY MEDICINE

## 2024-01-28 PROCEDURE — C9113 INJ PANTOPRAZOLE SODIUM, VIA: HCPCS | Performed by: EMERGENCY MEDICINE

## 2024-01-28 RX ORDER — ONDANSETRON 4 MG/1
4 TABLET, ORALLY DISINTEGRATING ORAL EVERY 8 HOURS PRN
Qty: 20 TABLET | Refills: 0 | Status: SHIPPED | OUTPATIENT
Start: 2024-01-28

## 2024-01-28 RX ORDER — HALOPERIDOL 5 MG/ML
5 INJECTION INTRAMUSCULAR ONCE
Status: COMPLETED | OUTPATIENT
Start: 2024-01-28 | End: 2024-01-28

## 2024-01-28 RX ORDER — PANTOPRAZOLE SODIUM 40 MG/1
40 TABLET, DELAYED RELEASE ORAL DAILY
Qty: 30 TABLET | Refills: 0 | Status: SHIPPED | OUTPATIENT
Start: 2024-01-28

## 2024-01-28 RX ORDER — PANTOPRAZOLE SODIUM 40 MG/10ML
40 INJECTION, POWDER, LYOPHILIZED, FOR SOLUTION INTRAVENOUS ONCE
Status: COMPLETED | OUTPATIENT
Start: 2024-01-28 | End: 2024-01-28

## 2024-01-28 RX ORDER — DROPERIDOL 2.5 MG/ML
1 INJECTION, SOLUTION INTRAMUSCULAR; INTRAVENOUS ONCE
Status: COMPLETED | OUTPATIENT
Start: 2024-01-28 | End: 2024-01-28

## 2024-01-28 RX ADMIN — SODIUM CHLORIDE 1000 ML: 0.9 INJECTION, SOLUTION INTRAVENOUS at 10:43

## 2024-01-28 RX ADMIN — HALOPERIDOL LACTATE 5 MG: 5 INJECTION, SOLUTION INTRAMUSCULAR at 10:43

## 2024-01-28 RX ADMIN — PANTOPRAZOLE SODIUM 40 MG: 40 INJECTION, POWDER, FOR SOLUTION INTRAVENOUS at 10:46

## 2024-01-28 NOTE — ED PROVIDER NOTES
History  Chief Complaint   Patient presents with    Alcohol Intoxication     Pt arrives from home via EMS with report of ETOH use last PM (+) 8 shots plus, last drink at approx 2200. Pt now with abd pain and nausea/vomiting.     Patient is a 28-year-old male.  He has a history of cannabis hyperemesis syndrome.  He continues to use marijuana daily.  Last night he drank a lot of alcohol.  This morning he woke with abdominal pain, nausea and vomiting.  No diarrhea.  No fever or chills.  Similar to prior episodes.  No hematemesis.  Symptoms are severe without relieving factors.  Presents by EMS for evaluation.        Prior to Admission Medications   Prescriptions Last Dose Informant Patient Reported? Taking?   Galcanezumab-gnlm 120 MG/ML SOAJ   No No   Sig: Inject 120 mg under the skin every 30 (thirty) days Following the first month loading dose of 2 pens.   NON FORMULARY  Self Yes No   Sig: Medical Marijuana   acetaminophen (TYLENOL) 325 mg tablet  Self Yes No   Sig: Take 650 mg by mouth every 6 (six) hours as needed   ibuprofen (MOTRIN) 200 mg tablet  Self No No   Sig: Take 2 tablets (400 mg total) by mouth every 8 (eight) hours as needed for mild pain or moderate pain   ondansetron (ZOFRAN-ODT) 4 mg disintegrating tablet   Yes No   Sig: Take 4 mg by mouth daily as needed   pantoprazole (PROTONIX) 40 mg tablet   Yes No   Sig: Take 40 mg by mouth 2 (two) times a day   rizatriptan (MAXALT) 10 MG tablet   No No   Sig: Take 1 tablet (10 mg total) by mouth once as needed for migraine May repeat in 2 hours if needed. Max 2/24 hours, 9/month.      Facility-Administered Medications: None       Past Medical History:   Diagnosis Date    Cellulitis of finger of left hand 4/17/2018    Head injury     Pneumothorax on left 4/17/2018    Tobacco abuse 4/17/2018       History reviewed. No pertinent surgical history.    Family History   Problem Relation Age of Onset    Breast cancer Mother     Heart disease Father     Hypertension  "Father     Hyperlipidemia Father     Cancer Paternal Grandmother      I have reviewed and agree with the history as documented.    E-Cigarette/Vaping    E-Cigarette Use Current Every Day User      E-Cigarette/Vaping Substances    Nicotine Yes     THC No     CBD No     Flavoring Yes     Other No     Unknown No      Social History     Tobacco Use    Smoking status: Former    Smokeless tobacco: Never   Vaping Use    Vaping status: Every Day    Substances: Nicotine, Flavoring   Substance Use Topics    Alcohol use: Yes     Comment: social    Drug use: Yes     Types: Marijuana     Comment: medical marijuana, pt reports getting marijuana from the street as it is \"cheaper\"       Review of Systems   Constitutional:  Negative for chills and fever.   HENT:  Negative for rhinorrhea and sore throat.    Eyes:  Negative for pain, redness and visual disturbance.   Respiratory:  Negative for cough and shortness of breath.    Cardiovascular:  Negative for chest pain and leg swelling.   Gastrointestinal:  Positive for abdominal pain, nausea and vomiting. Negative for diarrhea.   Endocrine: Negative for polydipsia and polyuria.   Genitourinary:  Negative for dysuria, frequency and hematuria.   Musculoskeletal:  Negative for back pain and neck pain.   Skin:  Negative for rash and wound.   Allergic/Immunologic: Negative for immunocompromised state.   Neurological:  Negative for weakness, numbness and headaches.   Psychiatric/Behavioral:  Negative for hallucinations and suicidal ideas.    All other systems reviewed and are negative.      Physical Exam  Physical Exam  Vitals reviewed.   Constitutional:       General: He is in acute distress.      Appearance: He is not toxic-appearing.   HENT:      Head: Normocephalic and atraumatic.      Nose: Nose normal.      Mouth/Throat:      Mouth: Mucous membranes are moist.   Eyes:      General:         Right eye: No discharge.         Left eye: No discharge.      Conjunctiva/sclera: Conjunctivae " normal.   Cardiovascular:      Rate and Rhythm: Normal rate and regular rhythm.      Pulses: Normal pulses.      Heart sounds: Normal heart sounds. No murmur heard.     No friction rub. No gallop.   Pulmonary:      Effort: Pulmonary effort is normal. No respiratory distress.      Breath sounds: Normal breath sounds. No stridor. No wheezing, rhonchi or rales.   Abdominal:      General: Bowel sounds are normal. There is no distension.      Palpations: Abdomen is soft.      Tenderness: There is abdominal tenderness. There is no right CVA tenderness, left CVA tenderness, guarding or rebound.      Comments: Mild to moderate epigastric tenderness.  No right lower quadrant tenderness.   Musculoskeletal:         General: No swelling, tenderness, deformity or signs of injury. Normal range of motion.      Cervical back: Normal range of motion and neck supple. No rigidity.      Right lower leg: No edema.      Left lower leg: No edema.      Comments: No calf pain or unilateral leg swelling   Skin:     General: Skin is warm and dry.      Coloration: Skin is not jaundiced or pale.      Findings: No bruising, erythema or rash.   Neurological:      General: No focal deficit present.      Mental Status: He is alert and oriented to person, place, and time.      Cranial Nerves: No facial asymmetry.      Sensory: No sensory deficit.      Motor: Motor function is intact.   Psychiatric:         Mood and Affect: Mood normal.         Behavior: Behavior normal.         Vital Signs  ED Triage Vitals [01/28/24 0940]   Temperature Pulse Respirations Blood Pressure SpO2   98.1 °F (36.7 °C) 65 14 161/93 100 %      Temp Source Heart Rate Source Patient Position - Orthostatic VS BP Location FiO2 (%)   Oral -- Lying Right arm --      Pain Score       8           Vitals:    01/28/24 0940 01/28/24 1049 01/28/24 1230   BP: 161/93 145/75 153/80   Pulse: 65 78 76   Patient Position - Orthostatic VS: Lying Lying Lying         Visual Acuity      ED  Medications  Medications   droperidol (FOR EMS ONLY) (INAPSINE) 2.5 mg/mL injection 2.5 mg (0 mg Does not apply Given to EMS 1/28/24 0942)   sodium chloride 0.9 % bolus 1,000 mL (1,000 mL Intravenous New Bag 1/28/24 1043)   haloperidol lactate (HALDOL) injection 5 mg (5 mg Intramuscular Given 1/28/24 1043)   pantoprazole (PROTONIX) injection 40 mg (40 mg Intravenous Given 1/28/24 1046)       Diagnostic Studies  Results Reviewed       Procedure Component Value Units Date/Time    Manual Differential(PHLEBS Do Not Order) [[id number]]  (Abnormal) Collected: 01/28/24 0940    Lab Status: Final result Specimen: Blood from Arm, Left Updated: 01/28/24 1240     Segmented % 73 %      Bands % 5 %      Lymphocytes % 16 %      Monocytes % 5 %      Eosinophils, % 1 %      Basophils % 0 %      Absolute Neutrophils 26.37 Thousand/uL      Lymphocytes Absolute 5.41 Thousand/uL      Monocytes Absolute 1.69 Thousand/uL      Eosinophils Absolute 0.34 Thousand/uL      Basophils Absolute 0.00 Thousand/uL      Total Counted --     RBC Morphology Normal     Platelet Estimate Adequate    Comprehensive metabolic panel [[id number]]  (Abnormal) Collected: 01/28/24 0940    Lab Status: Final result Specimen: Blood from Arm, Left Updated: 01/28/24 1127     Sodium 141 mmol/L      Potassium 3.7 mmol/L      Chloride 99 mmol/L      CO2 14 mmol/L      ANION GAP 28 mmol/L      BUN 16 mg/dL      Creatinine 1.00 mg/dL      Glucose 63 mg/dL      Calcium 9.9 mg/dL      AST 36 U/L      ALT 50 U/L      Alkaline Phosphatase 64 U/L      Total Protein 8.4 g/dL      Albumin 5.1 g/dL      Total Bilirubin 0.62 mg/dL      eGFR 102 ml/min/1.73sq m     Narrative:      National Kidney Disease Foundation guidelines for Chronic Kidney Disease (CKD):     Stage 1 with normal or high GFR (GFR > 90 mL/min/1.73 square meters)    Stage 2 Mild CKD (GFR = 60-89 mL/min/1.73 square meters)    Stage 3A Moderate CKD (GFR = 45-59 mL/min/1.73 square meters)    Stage 3B Moderate CKD (GFR  = 30-44 mL/min/1.73 square meters)    Stage 4 Severe CKD (GFR = 15-29 mL/min/1.73 square meters)    Stage 5 End Stage CKD (GFR <15 mL/min/1.73 square meters)  Note: GFR calculation is accurate only with a steady state creatinine    Lipase [[id number]]  (Normal) Collected: 01/28/24 0940    Lab Status: Final result Specimen: Blood from Arm, Left Updated: 01/28/24 1127     Lipase 24 u/L     CBC and differential [[id number]]  (Abnormal) Collected: 01/28/24 0940    Lab Status: Final result Specimen: Blood from Arm, Left Updated: 01/28/24 1111     WBC 33.81 Thousand/uL      RBC 4.78 Million/uL      Hemoglobin 14.6 g/dL      Hematocrit 44.2 %      MCV 93 fL      MCH 30.5 pg      MCHC 33.0 g/dL      RDW 13.3 %      MPV 11.8 fL      Platelets 295 Thousands/uL     Narrative:      This is an appended report.  These results have been appended to a previously verified report.                   No orders to display              Procedures  Procedures         ED Course                               SBIRT 22yo+      Flowsheet Row Most Recent Value   Initial Alcohol Screen: US AUDIT-C     1. How often do you have a drink containing alcohol? 1 Filed at: 01/28/2024 0944   2. How many drinks containing alcohol do you have on a typical day you are drinking?  4 Filed at: 01/28/2024 0944   3a. Male UNDER 65: How often do you have five or more drinks on one occasion? 1 Filed at: 01/28/2024 0944   Audit-C Score 6 Filed at: 01/28/2024 0944   BLESSING: How many times in the past year have you...    Used an illegal drug or used a prescription medication for non-medical reasons? Once or Twice Filed at: 01/28/2024 0944   DAST-10: In the past 12 months...    1. Have you used drugs other than those required for medical reasons? 0 Filed at: 01/28/2024 0944   2. Do you use more than one drug at a time? 0 Filed at: 01/28/2024 0944   3. Have you had medical problems as a result of your drug use (e.g., memory loss, hepatitis, convulsions, bleeding, etc.)? 0  "Filed at: 01/28/2024 0944   4. Have you had \"blackouts\" or \"flashbacks\" as a result of drug use?YesNo 0 Filed at: 01/28/2024 0944   5. Do you ever feel bad or guilty about your drug use? 0 Filed at: 01/28/2024 0944   6. Does your spouse (or parent) ever complain about your involvement with drugs? 0 Filed at: 01/28/2024 0944   7. Have you neglected your family because of your use of drugs? 0 Filed at: 01/28/2024 0944   8. Have you engaged in illegal activities in order to obtain drugs? 0 Filed at: 01/28/2024 0944   9. Have you ever experienced withdrawal symptoms (felt sick) when you stopped taking drugs? 0 Filed at: 01/28/2024 0944   10. Are you always able to stop using drugs when you want to? 0 Filed at: 01/28/2024 0944   DAST-10 Score 0 Filed at: 01/28/2024 0944                      Medical Decision Making  Patient has a history of cannabis hyperemesis syndrome.  This is likely the same.  Did feel improved with treatment with Protonix and Haldol.  He is tolerating p.o.  Laboratory evaluation was concerning for an elevation in white blood cell count.  It was greater than 30,000.  Review of old white blood cell counts during previous episode show white blood cell count as high as 14,000.  Patient is afebrile.  He has not been sick with cough or URI symptoms.  He has a benign abdominal examination at this time.  There is no right lower quadrant tenderness to suggest appendicitis.  Although the etiology of the elevated white blood cell count is unclear, there are no signs or symptoms of infection at this time.  This can be worked up as an outpatient.  This could be a dress reaction, though thousand is a little high for stress reaction.  There was no pancreatitis.  Lungs are clear.  No urinary symptoms to suggest UTI.  Patient is not septic.  Does not appear to be high enough for acute leukemia.  She was made aware of this finding.  Will return if condition worsens or fever.  Further workup with primary MD.    Amount " and/or Complexity of Data Reviewed  Labs: ordered. Decision-making details documented in ED Course.    Risk  Prescription drug management.  Decision regarding hospitalization.             Disposition  Final diagnoses:   Nausea and vomiting   Leukocytosis     Time reflects when diagnosis was documented in both MDM as applicable and the Disposition within this note       Time User Action Codes Description Comment    1/28/2024  1:23 PM Michael Mcbride Add [R11.2] Nausea and vomiting     1/28/2024  1:24 PM Michael Mcbride Add [D72.829] Leukocytosis           ED Disposition       ED Disposition   Discharge    Condition   Stable    Date/Time   Sun Jan 28, 2024 1323    Comment   Grant Crowley II discharge to home/self care.                   Follow-up Information       Follow up With Specialties Details Why Contact Info Additional Information    [facility] In 2 days  [address]  [address]  [facility] [id number] [phone] [facility], [address], [place], [place], [id number] [phone]    [facility] In 1 week  [address]  [address]  [facility] [id number] [phone] [facility] [place], [facility] [address], [place], [place], [id number] [phone]            Patient's Medications   Discharge Prescriptions    ONDANSETRON (ZOFRAN-ODT) 4 MG DISINTEGRATING TABLET    Take 1 tablet (4 mg total) by mouth every 8 (eight) hours as needed for nausea or vomiting       Start Date: 1/28/2024 End Date: --       Order Dose: 4 mg       Quantity: 20 tablet    Refills: 0    PANTOPRAZOLE (PROTONIX) 40 MG TABLET    Take 1 tablet (40 mg total) by mouth daily       Start Date: 1/28/2024 End Date: --       Order Dose: 40 mg       Quantity: 30 tablet    Refills: 0       No discharge procedures on file.    PDMP Review        None            ED Provider  Electronically Signed by             Michael Mcbride MD  01/28/24 9400

## 2024-04-13 ENCOUNTER — HOSPITAL ENCOUNTER (EMERGENCY)
Facility: HOSPITAL | Age: 29
Discharge: HOME | End: 2024-04-13
Attending: EMERGENCY MEDICINE
Payer: COMMERCIAL

## 2024-04-13 ENCOUNTER — APPOINTMENT (EMERGENCY)
Dept: CT IMAGING | Facility: HOSPITAL | Age: 29
End: 2024-04-13
Payer: COMMERCIAL

## 2024-04-13 VITALS
BODY MASS INDEX: 17.24 KG/M2 | DIASTOLIC BLOOD PRESSURE: 70 MMHG | RESPIRATION RATE: 18 BRPM | WEIGHT: 120.15 LBS | SYSTOLIC BLOOD PRESSURE: 144 MMHG | HEART RATE: 71 BPM | OXYGEN SATURATION: 100 % | TEMPERATURE: 98.2 F

## 2024-04-13 DIAGNOSIS — R51.9 HEADACHE: Primary | ICD-10-CM

## 2024-04-13 PROCEDURE — 96365 THER/PROPH/DIAG IV INF INIT: CPT

## 2024-04-13 PROCEDURE — 99284 EMERGENCY DEPT VISIT MOD MDM: CPT | Performed by: EMERGENCY MEDICINE

## 2024-04-13 PROCEDURE — 96375 TX/PRO/DX INJ NEW DRUG ADDON: CPT

## 2024-04-13 PROCEDURE — 99283 EMERGENCY DEPT VISIT LOW MDM: CPT

## 2024-04-13 PROCEDURE — 70450 CT HEAD/BRAIN W/O DYE: CPT

## 2024-04-13 RX ORDER — SUMATRIPTAN 50 MG/1
50 TABLET, FILM COATED ORAL
COMMUNITY
Start: 2024-01-16 | End: 2025-01-15

## 2024-04-13 RX ORDER — SODIUM CHLORIDE, SODIUM GLUCONATE, SODIUM ACETATE, POTASSIUM CHLORIDE, MAGNESIUM CHLORIDE, SODIUM PHOSPHATE, DIBASIC, AND POTASSIUM PHOSPHATE .53; .5; .37; .037; .03; .012; .00082 G/100ML; G/100ML; G/100ML; G/100ML; G/100ML; G/100ML; G/100ML
1000 INJECTION, SOLUTION INTRAVENOUS ONCE
Status: COMPLETED | OUTPATIENT
Start: 2024-04-13 | End: 2024-04-13

## 2024-04-13 RX ORDER — DIPHENHYDRAMINE HCL 25 MG
25 TABLET ORAL ONCE
Status: COMPLETED | OUTPATIENT
Start: 2024-04-13 | End: 2024-04-13

## 2024-04-13 RX ORDER — KETOROLAC TROMETHAMINE 30 MG/ML
1 INJECTION, SOLUTION INTRAMUSCULAR; INTRAVENOUS ONCE
Status: COMPLETED | OUTPATIENT
Start: 2024-04-13 | End: 2024-04-13

## 2024-04-13 RX ORDER — DROPERIDOL 2.5 MG/ML
1.25 INJECTION, SOLUTION INTRAMUSCULAR; INTRAVENOUS ONCE
Status: COMPLETED | OUTPATIENT
Start: 2024-04-13 | End: 2024-04-13

## 2024-04-13 RX ADMIN — DIPHENHYDRAMINE HYDROCHLORIDE 25 MG: 25 TABLET ORAL at 12:54

## 2024-04-13 RX ADMIN — SODIUM CHLORIDE, SODIUM GLUCONATE, SODIUM ACETATE, POTASSIUM CHLORIDE, MAGNESIUM CHLORIDE, SODIUM PHOSPHATE, DIBASIC, AND POTASSIUM PHOSPHATE 1000 ML: .53; .5; .37; .037; .03; .012; .00082 INJECTION, SOLUTION INTRAVENOUS at 13:35

## 2024-04-13 RX ADMIN — DROPERIDOL 1.25 MG: 2.5 INJECTION, SOLUTION INTRAMUSCULAR; INTRAVENOUS at 12:22

## 2024-04-13 NOTE — ED ATTENDING ATTESTATION
4/13/2024  I, Ad Wolfe MD, saw and evaluated the patient. I have discussed the patient with the resident/non-physician practitioner and agree with the resident's/non-physician practitioner's findings, Plan of Care, and MDM as documented in the resident's/non-physician practitioner's note, except where noted. All available labs and Radiology studies were reviewed.  I was present for key portions of any procedure(s) performed by the resident/non-physician practitioner and I was immediately available to provide assistance.       At this point I agree with the current assessment done in the Emergency Department.  I have conducted an independent evaluation of this patient a history and physical is as follows:    28-year-old male, history of migraine headaches, presenting with headache.  On exam, patient appears anxious, has no awake and alert, answering questions appropriately, no gross focal neurologic deficits.    ED Course     Head CT read by radiology, no acute findings.  Patient improved after receiving medications, instructed to follow-up with his doctors.    Critical Care Time  Procedures

## 2024-04-13 NOTE — DISCHARGE INSTRUCTIONS
You were seen in the emergency department today for headache.    Your testing showed no acute abnormality on your head CT.    Follow up with your primary care provider and neurologist.     Take Tylenol / Ibuprofen as needed following the instructions on the bottle as needed for headache.     Return to the emergency department for any new or concerning symptoms including worsening pain, focal weakness.     Thank you for choosing [facility] for your care today.
